# Patient Record
Sex: FEMALE | Race: WHITE | Employment: PART TIME | URBAN - METROPOLITAN AREA
[De-identification: names, ages, dates, MRNs, and addresses within clinical notes are randomized per-mention and may not be internally consistent; named-entity substitution may affect disease eponyms.]

---

## 2017-01-10 ENCOUNTER — HOSPITAL ENCOUNTER (OUTPATIENT)
Dept: MAMMOGRAPHY | Age: 47
Discharge: HOME | End: 2017-01-10
Attending: INTERNAL MEDICINE
Payer: OTHER GOVERNMENT

## 2017-01-10 DIAGNOSIS — Z12.31 VISIT FOR SCREENING MAMMOGRAM: ICD-10-CM

## 2017-01-10 PROCEDURE — 77067 SCR MAMMO BI INCL CAD: CPT

## 2018-01-15 ENCOUNTER — HOSPITAL ENCOUNTER (OUTPATIENT)
Dept: MAMMOGRAPHY | Age: 48
Discharge: HOME | End: 2018-01-15
Attending: INTERNAL MEDICINE
Payer: OTHER GOVERNMENT

## 2018-01-15 DIAGNOSIS — Z12.39 BREAST CANCER SCREENING: ICD-10-CM

## 2018-01-15 PROCEDURE — 77067 SCR MAMMO BI INCL CAD: CPT

## 2018-03-30 ENCOUNTER — OFFICE VISIT (OUTPATIENT)
Dept: ENDOCRINOLOGY | Age: 48
End: 2018-03-30

## 2018-03-30 VITALS
HEART RATE: 92 BPM | SYSTOLIC BLOOD PRESSURE: 126 MMHG | RESPIRATION RATE: 14 BRPM | WEIGHT: 170.4 LBS | BODY MASS INDEX: 31.36 KG/M2 | HEIGHT: 62 IN | DIASTOLIC BLOOD PRESSURE: 78 MMHG

## 2018-03-30 RX ORDER — METFORMIN HYDROCHLORIDE 500 MG/1
1000 TABLET, EXTENDED RELEASE ORAL 2 TIMES DAILY
Qty: 360 TAB | Refills: 3
Start: 2018-03-30 | End: 2018-05-14 | Stop reason: SDUPTHER

## 2018-03-30 RX ORDER — INSULIN PUMP SYRINGE, 3 ML
EACH MISCELLANEOUS
Qty: 1 KIT | Refills: 0 | Status: SHIPPED | OUTPATIENT
Start: 2018-03-30

## 2018-03-30 NOTE — PROGRESS NOTES
Stan Augustine is a 41 y.o. female      Chief Complaint   Patient presents with    New Patient     Pt seen at the request by [facility] to [facility]riel diabetes.  Diabetes     Last A1c 9.5 % [facility] last week. 1. Have you been to the ER, urgent care clinic since your last visit? Hospitalized since your last visit? No    2. Have you seen or consulted any other health care providers outside of the [address] [place] [address] since your last visit? Include any pap smears or colon screening.  No

## 2018-03-30 NOTE — LETTER
March 30, 2018 Re: Cyn Jensen To whom it may concern Ms. Shikha Rutledge was seen in our office today for evaluation and management of type 2 diabetes mellitus. Please excuse her for the time spent in our office this morning. She should be able to return to work at her next scheduled work time. If you have any questions please do not hesitate to contact me at any time Sincerely Thalia Kendall MD

## 2018-03-30 NOTE — PROGRESS NOTES
Presentation:   Rhonda Hampton is a 41 y.o. female with Type 2 diabetes comes for diabetes care and self-management training. Diagnosed with GDM fifteen years ago which did not go away after delivery of second child 2003. Positive family history of diabetes in son. Originally treated with  oral agents: glipizide (generic); now on oral agents (dual therapy): glipizide (generic), metformin (generic). A1c 9.5% (last week). Past Medical History:   Diagnosis Date    Abnormal mammogram     calcifications in scar tissue     AR (allergic rhinitis)     Diabetes ([facility] [address]     TYPE II    Dyslipidemia 4/11/2012    Fibroid 5/2/2014    Gallbladder polyp     Hematuria     reports negative work-up by urology 1997? 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  glipiZIDE (GLUCOTROL) 10 mg tablet TAKE 1 TABLET TWICE A DAY    levothyroxine (SYNTHROID) 25 mcg tablet TAKE 1 TABLET DAILY BEFORE BREAKFAST    lisinopril (PRINIVIL, ZESTRIL) 10 mg tablet TAKE 1 TABLET DAILY    metFORMIN ER (GLUCOPHAGE XR) 750 mg tablet TAKE 3 TABLETS DAILY WITH DINNER    miglitol (GLYSET) 50 mg tablet Take 1 Tab by mouth three (3) times daily (with meals).  magnesium 250 mg tab Take  by mouth.  multivitamin with iron tablet Take 1 Tab by mouth daily.  vitamin c-vitamin e (CRANBERRY CONCENTRATE) cap Take  by mouth.  ibuprofen (MOTRIN) 800 mg tablet Take 1 Tab by mouth every eight (8) hours as needed for Pain.  omega-3 fatty acids-vitamin e (FISH OIL) 1,000 mg cap Take 1 Cap by mouth daily.     GLYSET 25 mg tablet TAKE 1 TABLET THREE TIMES A DAY WITH MEALS    omeprazole (PRILOSEC) 20 mg capsule TAKE 1 CAPSULE DAILY    PREVNAR 13, PF, 0.5 mL syrg injection INJECT ONCE    FLUARIX QUAD 1297-9313, PF, syrg injection INJECT ONCE    pitavastatin (LIVALO) 1 mg tab tablet Take 1 Tab by mouth daily. Take instead of pravastatin due to intolerance.  glucose blood VI test strips (ASCENSIA AUTODISC VI, ONE TOUCH ULTRA TEST VI) strip As directed daily. Precision Extra     No current facility-administered medications for this visit. Medication compliance per rooming staff. Subjective:   Diabetes Self-care Practices  Healthy Eating-Consumes High carbohydrate meals 3 times on most days. Variation in CHO load noted. Twenty-four hour/usual dietary practices include:   (B) Muslim oatmeal with blueberries in a cup with water & coffee   (L) 's salad; cafeteria lunch (pasta with meatballs, broccoli, and mandarin oranges   (Sn) Chips or PB cups   (D) Lots of fast food while mother has been adjusting to nursing home; fish/chicken with starch and vegetables (moreso in summer months)   (BT) Cookies or applesauce or Pepsi  Being Active- Reports no regular exercise; is walking the dog daily   Monitoring-She is not testing blood glucoses  Taking Medications-She is taking prescribed diabetes medications. Objective:   Alert, oriented and in no acute distress     Visit Vitals    /78 (BP 1 Location: Right arm, BP Patient Position: Sitting)    Pulse 92    Resp 14    Ht 5' 2\" (1.575 m)    Wt 170 lb 6.4 oz (77.3 kg)    BMI 31.17 kg/m2        Lab Results   Component Value Date    HBA1C 8.0 (H) 05/23/2016    HBA1C 8.6 (H) 11/25/2015    HBA1C 8.8 (H) 07/10/2015    WAK5OZOS 7.9 (A) 03/01/2016    TJJ1AVDH 8.1 (A) 02/20/2015    DLJ3PTWZ 7.9% 05/02/2014    CHOL 172 08/15/2016    LDLC 74 08/15/2016    GFRAA >60 06/19/2014    GFRNA >60 06/19/2014    MCACR 33.8 (H) 03/01/2016    TSH 2.200 05/23/2016    VITD3 32.0 05/23/2016       Diabetes Self-care Practices  Problemsolving-Participated in constructing diabetes strategy using shared decision making. Healthy Coping-She is not anxious or depressed.  Feels sad.  Reducing Risks- On BP medications and statin therapy. Health Maintenance   Topic Date Due    EYE EXAM RETINAL OR DILATED Q1  2016    HEMOGLOBIN A1C Q6M  2016    MICROALBUMIN Q1  2017    Influenza Age 5 to Adult  2017    FOOT EXAM Q1  08/15/2017    LIPID PANEL Q1  08/15/2017    PAP AKA CERVICAL CYTOLOGY  2019    DTaP/Tdap/Td series (2 - Td) 2025    Pneumococcal 19-64 Medium Risk  Completed      Barriers to diabetes self-care-Include Social: Father  3 years ago. Additionally, as the only child, she has needed to place her mother in demential care recently. There were lots of decisions as well as going through her mother's home etc.    Nursing Diagnosis:   Ineffective Health Management    Nursing Interventions:   Examined usual diabetes self-management practices related to meals, physical activity, BG monitoring and medication dosing. Explored strategies patient is willing to incorporate into their self-care plan. Informed of starchy and non-starchy vegetables that are both negatively and positively affecting blood glucoses. Instructed in Healthy Plate using \"Where do I begin? \" booklet (ADA). Evaluation:   Patient is ready, willing, and agrees to attend to healthy eating principles. Plan:   1. Diabetes medication reconciliation per Verlan Crigler MD  2. Patient will focus on increasing vegetable consumption with smaller servings of starch  3.  RTC one month for serum fructosamine and decision regarding need for insulin therapy    Jose De Paz DNP, RN, ACNS-BC, BC-ADM, CDE  Adult Health Clinical Nurse Specialist-Diabetes & Endocrine  Phone: [phone]  Fax:  [phone]

## 2018-03-30 NOTE — PROGRESS NOTES
Chief Complaint   Patient presents with    New Patient     Pt seen at the request by [facility] to eval diabetes.  Diabetes     Last A1c 9.5 % [facility]. HPI  This is a 68-year-old white female with a history of gestational diabetes 15 years ago referred for evaluation and management of type 2 diabetes mellitus. Her baby weighed 9 lbs. 5 oz. at birth but was actually premature. She was not on any medication during the pregnancy but has had diabetes ever since. She has a son who has type 2 diabetes mellitus and a sister with diabetes. She has been on metformin 1000 mg twice daily and glipizide 10 mg twice daily with intermittently fair blood sugar control. [facility]    Her past medical history is significant for a history of a total abdominal hysterectomy with a fistula involving the bladder resulting in multiple urinary tract infections. She tells us that there is lots of stress in her life related to her mother who is now in a nursing home with Alzheimer's disease. She works at the [employer] as a cook and . She works from 9 until 3. She eats breakfast at home. This is usually prepared oatmeal with a banana and water she drinks a Starbucks coffee which contain sugar. Lunch could be a salad or more often it is of pasta and meatballs and broccoli and oranges. She may have a salad with  so she may have barbecue without the bread. She drinks water for lunch. She snacks on peanut butter cups in the afternoon or occasionally Chipotle chips. Things are very hectic at home and so they may eat out several days a week. If they go out they may have [employer]quel [employer] or while walks up or appropriately cyst salads or pizza. If she has beats that she may have 4-5 slices. She drinks regular Pepsi or water for dinner. Occasionally at home he will have fish with non-starchy vegetables.   They are under the impression that whole grain pasta and whole-grain the lower is better in terms with her blood sugar control. Please also note that her  has type 2 diabetes mellitus and previously was followed by Dr. Rosario Laws here at [facility]. ROS  Denies chest pain, shortness of breath, constipation or diarrhea. She denies neuropathic symptoms. She does complain of urinary tract infections as noted above.   Family History   Problem Relation Age of Onset    Arthritis-osteo Mother     Diabetes Mother     Elevated Lipids Mother     Heart Disease Mother     Hypertension Mother     Depression Mother     Heart Disease Father     Diabetes Father     Heart Disease Maternal Grandmother     Heart Attack Maternal Grandmother     Anesth Problems Neg Hx         Social History     Social History    Marital status:      Spouse name: N/A    Number of children: N/A    Years of education: N/A     Social History Main Topics    Smoking status: Never Smoker    Smokeless tobacco: Never Used    Alcohol use No    Drug use: No    Sexual activity: Not Currently     Partners: Male     Birth control/ protection: Surgical     Other Topics Concern    None     Social History Narrative        Vitals:    03/30/18 1012   BP: 126/78   Pulse: 92   Resp: 14   Weight: 170 lb 6.4 oz (77.3 kg)   Height: 5' 2\" (1.575 m)   PainSc:   0 - No pain   LMP: 06/04/2014        Physical Examination: General appearance - alert, well appearing, and in no distress  Mental status - alert, oriented to person, place, and time  Chest - clear to auscultation, no wheezes, rales or rhonchi, symmetric air entry  Heart - normal rate, regular rhythm, normal S1, S2, no murmurs, rubs, clicks or gallops  Abdomen - soft, nontender, nondistended, no masses or organomegaly  Extremities - peripheral pulses normal, no pedal edema, no clubbing or cyanosis    Diabetic foot exam:     Left: Reflexes 4+     Vibratory sensation normal    Filament test normal sensation with micro filament   Pulse DP: 2+ (normal)   Pulse PT: 2+ (normal)   Deformities: None  Right: Reflexes 4+   Vibratory sensation normal   Filament test normal sensation with micro filament   Pulse DP: 2+ (normal)   Pulse PT: 2+ (normal)   Deformities: None      ASSESSMENT & PLAN  This woman has type 2 diabetes mellitus currently on full dose metformin and full dose glipizide. She had been on Januvia 25 mg but but heard about the possibility of pancreatic cancer and so she stopped it on her own. It is important to note that even on metformin plus glipizide plus low-dose Januvia, her blood sugars were not well controlled. So the recent A1c of 9.5% reflects that medication as well. Our plan is as follows:  1. We would like to emphasize diet as a first step in her blood sugar control. Vandana White spent considerable time today going over her diet and emphasizing non-starchy vegetables and a reduction in her carbohydrate intake. She admits that when they go out to eat she knows which foods she should eat but she then makes the wrong choice most of the time. So we will try 1 month of her emphasizing her carbohydrate intake and focusing on non-starchy vegetables. 2.  We encouraged her to start checking blood sugar in the morning so we have a sense for where we are going. 3.  The addition of a GLP-1 is certainly a possibility. Given her history of urinary tract infections related to her bladder fistula, the use of a SGLT2 inhibitor seems unwise. 4.  We will see her back in 1 month.

## 2018-05-14 ENCOUNTER — OFFICE VISIT (OUTPATIENT)
Dept: ENDOCRINOLOGY | Age: 48
End: 2018-05-14

## 2018-05-14 VITALS
BODY MASS INDEX: 31.17 KG/M2 | HEART RATE: 92 BPM | WEIGHT: 169.4 LBS | HEIGHT: 62 IN | SYSTOLIC BLOOD PRESSURE: 107 MMHG | DIASTOLIC BLOOD PRESSURE: 78 MMHG

## 2018-05-14 DIAGNOSIS — E11.9 TYPE 2 DIABETES MELLITUS WITHOUT COMPLICATION, WITHOUT LONG-TERM CURRENT USE OF INSULIN (HCC): Primary | ICD-10-CM

## 2018-05-14 LAB — HBA1C MFR BLD HPLC: 8.5 %

## 2018-05-14 RX ORDER — GLIPIZIDE 10 MG/1
TABLET ORAL
Qty: 180 TAB | Refills: 3 | Status: SHIPPED | OUTPATIENT
Start: 2018-05-14 | End: 2018-11-09 | Stop reason: ALTCHOICE

## 2018-05-14 RX ORDER — METFORMIN HYDROCHLORIDE 500 MG/1
1000 TABLET, EXTENDED RELEASE ORAL 2 TIMES DAILY
Qty: 360 TAB | Refills: 3 | Status: SHIPPED | OUTPATIENT
Start: 2018-05-14 | End: 2019-03-22 | Stop reason: SDUPTHER

## 2018-05-14 NOTE — PROGRESS NOTES
Barbara Roach is a 41 y.o. female    Chief Complaint   Patient presents with    Follow-up    Diabetes    Other     Eye exam is on June 5th, 2018       1. Have you been to the ER, urgent care clinic since your last visit? Hospitalized since your last visit? No    2. Have you seen or consulted any other health care providers outside of the [address] [place] [address] since your last visit? Include any pap smears or colon screening.  No

## 2018-05-14 NOTE — PROGRESS NOTES
The patient was seen by myself and Emilia Busby DNP, CNS    Ms. James Hickey returns for follow-up of her diabetes mellitus currently on full dose metformin and full dose glipizide. Although it is only been about 6 weeks since her last A1c, we repeated it today and it is down to 8.5 from a high of 9.5%. She checks her blood sugars in the morning and in the evening. Morning blood sugars are around 180-200. The afternoon blood sugars range from 100-125. She has not had any hypoglycemia. She is eating very low-carb at this point. Her cough he now has Splenda instead of regular sugar. She is eliminated fast foods. Breakfast is usually oatmeal with a banana or eggs and toast.  Lunch is a salad with protein. Nat Cruel is a fish or chicken with non-starchy vegetables and if she has a potato is a very small portion. She occasionally has fruit or peanut butter toast at bedtime. Examination  Blood pressure 107/78  Pulse 92  Weight 169    Impression type 2 diabetes mellitus with improving glucose control although certainly not at goal.  Plan: We have elected to continue the oral therapy that she is on for now. She and her son are going to work on weight loss together. Her son is 13years old and weighs 280 pounds and has been told by the  that he needs to lose weight. The patient is hopeful that if the 2 of them do this together, she will lose weight and her blood sugars were get much better. So we will see her back in 2 months. We will probably do a serum fructosamine at that time to assess the efficacy of the therapy. We spent more than 25 mintutes face to face, more than 50% was in counseling regarding diet, exercise and carbohydrate intake.

## 2018-08-13 ENCOUNTER — OFFICE VISIT (OUTPATIENT)
Dept: ENDOCRINOLOGY | Age: 48
End: 2018-08-13

## 2018-08-13 VITALS
BODY MASS INDEX: 30.95 KG/M2 | HEIGHT: 62 IN | HEART RATE: 94 BPM | WEIGHT: 168.2 LBS | SYSTOLIC BLOOD PRESSURE: 123 MMHG | DIASTOLIC BLOOD PRESSURE: 80 MMHG

## 2018-08-13 DIAGNOSIS — E11.9 TYPE 2 DIABETES MELLITUS WITHOUT COMPLICATION, WITHOUT LONG-TERM CURRENT USE OF INSULIN (HCC): Primary | ICD-10-CM

## 2018-08-13 LAB — HBA1C MFR BLD HPLC: 8.3 %

## 2018-08-13 RX ORDER — FLUCONAZOLE 150 MG/1
150 TABLET ORAL DAILY
Qty: 1 TAB | Refills: 0 | Status: SHIPPED | OUTPATIENT
Start: 2018-08-13 | End: 2018-08-14

## 2018-08-13 RX ORDER — NAPROXEN SODIUM 220 MG
1 TABLET ORAL DAILY
Qty: 100 SYRINGE | Refills: 3 | Status: SHIPPED | OUTPATIENT
Start: 2018-08-13 | End: 2018-08-14 | Stop reason: SDUPTHER

## 2018-08-13 RX ORDER — GLIPIZIDE 10 MG/1
10 TABLET, FILM COATED, EXTENDED RELEASE ORAL DAILY
COMMUNITY
Start: 2018-08-03 | End: 2018-11-09 | Stop reason: ALTCHOICE

## 2018-08-13 NOTE — PROGRESS NOTES
The patient was seen by myself and Martín Voss DNP, CNS    Ms. Sheridan Steiner returns for follow-up of her type 2 diabetes mellitus with poor blood sugar control on metformin plus glipizide. When we saw her last her A1c was 8.5%. Her A1c today is 8.3%. She has been trying very hard and she is pleased that her A1c has fallen at all. However she admits that her blood sugars in the morning are consistently in the 200-230 range. She occasionally checks before dinner and they can be in the  range. She has been exercising with her son at [employer]. She is also been watching what she eats. Despite that she sees a fasting blood sugars that are consistently elevated and she is very frustrated. Examination  Blood pressure 123/80  Pulse 94  Weight 168    Impression type 2 diabetes mellitus with minimal change in A1c. Plan: We elected to begin NPH insulin 20 units at bedtime. She was given a sample of NPH and a prescription was written. We will titrate the dose up or down to achieve a fasting blood sugar in the  range. Both her  and her son take insulin and so she will have no difficulty administering the insulin. Overall I think she is pleased that we are going to do something to get her blood sugar down. We will see her back at the same time that we visit her son who will be a new patient to us. We spent more than 25 mintutes face to face, more than 50% was in counseling regarding diet, exercise and carbohydrate intake.

## 2018-08-14 DIAGNOSIS — E11.9 TYPE 2 DIABETES MELLITUS WITHOUT COMPLICATION, WITHOUT LONG-TERM CURRENT USE OF INSULIN (HCC): ICD-10-CM

## 2018-08-14 RX ORDER — NAPROXEN SODIUM 220 MG
1 TABLET ORAL DAILY
Qty: 100 SYRINGE | Refills: 3 | Status: SHIPPED | OUTPATIENT
Start: 2018-08-14 | End: 2019-11-08 | Stop reason: SDUPTHER

## 2018-08-14 NOTE — TELEPHONE ENCOUNTER
Patient is requesting a new prescription to be sent to [facility] for her syringes. She stated that originally it was sent to [facility] but they informed her that she can get it from her local pharmacy. Patient can be reached at [phone] and the pharmacy is [phone].
Requested Prescriptions     Pending Prescriptions Disp Refills    Insulin Syringe-Needle U-100 (ADVOCATE SYRINGES) 0.5 mL 31 gauge x 5/16 syrg 100 Syringe 3     Si Each by Does Not Apply route daily.
DISPLAY PLAN FREE TEXT

## 2018-08-20 ENCOUNTER — TELEPHONE (OUTPATIENT)
Dept: ENDOCRINOLOGY | Age: 48
End: 2018-08-20

## 2018-08-20 NOTE — TELEPHONE ENCOUNTER
Ms. Adrián Enriquez evening blood sugar readings for the week of August 14th.    8/14: 161  8/15: 165  8/16: 164  8/17: 198  8/18: 169  8/19: 177    Ms. Valerie Graff would like a call back instead of a [employer] message to discuss if her insulin needs to be adjusted. She stated that she is currently locked out of her [employer] account.

## 2018-09-28 ENCOUNTER — OFFICE VISIT (OUTPATIENT)
Dept: ENDOCRINOLOGY | Age: 48
End: 2018-09-28

## 2018-09-28 VITALS
HEART RATE: 88 BPM | WEIGHT: 172.2 LBS | HEIGHT: 62 IN | BODY MASS INDEX: 31.69 KG/M2 | SYSTOLIC BLOOD PRESSURE: 134 MMHG | DIASTOLIC BLOOD PRESSURE: 79 MMHG

## 2018-09-28 NOTE — PROGRESS NOTES
The patient was seen by myself and Leia José DNP, CNS    Ms. Sb Smiley returns for follow-up of her type 2 diabetes mellitus. When we saw her a month ago, we added 20 units of NPH insulin at bedtime. She is been doing this for the last month and her fasting blood sugars have improved nicely. Most of the fasting blood sugars range between 101 140. She rarely checks any other time of the day. She does however have a few low blood sugars in the afternoon which she attributes to lower meals in the morning and/or missing lunch. She is back to work as a teacher. Breakfast is toast with peanut butter and fruit. Lunch is a salad with tuna or chicken and fruit. Ruthe Henry is usually fish with vegetables and a small amount a starch or occasionally positive. She is walking for 30 minutes after school. Examination  Blood pressure 134/79  Pulse 88  Weight 172    Impression type 2 diabetes mellitus with improving control since we have added NPH insulin 28 units at bedtime. She continues the glipizide and metformin. Plan: Given the fact that she is having some symptomatic hypoglycemia in the afternoon, we have recommended that she decrease the insulin to 24 units and continue the glipizide and metformin. We will see her back in 1 month and repeat her A1c. Please note she is very pleased with the improvement in her blood sugars. We spent more than 25 mintutes face to face, more than 50% was in counseling regarding diet, exercise and carbohydrate intake.

## 2018-10-03 RX ORDER — LISINOPRIL 10 MG/1
10 TABLET ORAL DAILY
Qty: 90 TAB | Refills: 3 | Status: SHIPPED | OUTPATIENT
Start: 2018-10-03 | End: 2019-03-22 | Stop reason: SDUPTHER

## 2018-10-03 NOTE — TELEPHONE ENCOUNTER
Mrs. Caitie Ho called and stated that Dr. Davina Monzon was suppose to send in a prescription for her lisinopril last Friday.

## 2018-11-09 ENCOUNTER — OFFICE VISIT (OUTPATIENT)
Dept: ENDOCRINOLOGY | Age: 48
End: 2018-11-09

## 2018-11-09 VITALS
HEIGHT: 62 IN | WEIGHT: 174 LBS | SYSTOLIC BLOOD PRESSURE: 129 MMHG | HEART RATE: 78 BPM | BODY MASS INDEX: 32.02 KG/M2 | DIASTOLIC BLOOD PRESSURE: 71 MMHG

## 2018-11-09 DIAGNOSIS — E11.65 UNCONTROLLED TYPE 2 DIABETES MELLITUS WITH HYPERGLYCEMIA (HCC): Primary | ICD-10-CM

## 2018-11-09 RX ORDER — GLIPIZIDE 5 MG/1
5 TABLET ORAL 2 TIMES DAILY
Qty: 180 TAB | Refills: 3 | Status: SHIPPED | OUTPATIENT
Start: 2018-11-09 | End: 2018-12-21 | Stop reason: ALTCHOICE

## 2018-11-09 RX ORDER — ROSUVASTATIN CALCIUM 5 MG/1
5 TABLET, COATED ORAL
Qty: 90 TAB | Refills: 3 | Status: SHIPPED | OUTPATIENT
Start: 2018-11-09 | End: 2018-11-09 | Stop reason: SDUPTHER

## 2018-11-09 RX ORDER — ROSUVASTATIN CALCIUM 5 MG/1
5 TABLET, COATED ORAL
Qty: 90 TAB | Refills: 3 | Status: SHIPPED | OUTPATIENT
Start: 2018-11-09 | End: 2019-03-22 | Stop reason: SDUPTHER

## 2018-11-09 RX ORDER — GLIPIZIDE 10 MG/1
10 TABLET, FILM COATED, EXTENDED RELEASE ORAL 2 TIMES DAILY
Qty: 180 TAB | Refills: 3 | Status: CANCELLED | OUTPATIENT
Start: 2018-11-09

## 2018-11-09 NOTE — PROGRESS NOTES
Ms. Bucky Burrell returns for follow-up of her type 2 diabetes mellitus currently on 24 units of NPH at night and metformin thousand milligrams twice daily and glipizide 10 mg twice daily. The addition of insulin has had a dramatic effect in her blood sugars. She was originally on 28 units of NPH insulin at night and her blood sugars in the morning were in the 120-140 range. She was concerned about low blood sugars so I decreased the insulin to 24 units. Unfortunately her fasting blood sugars are now in the 140-160 range. Importantly, however she is now having low blood sugars in the evening. She frequently sees blood sugars in the 40-70 range. Day before yesterday she had a morning blood sugar of 181 but her blood sugar at 4 PM of 73. Breakfast is 2 pieces of toast with peanut butter. Lunch is a sandwich. Last night for dinner she had someone times and vegetables and woke up this morning with a blood sugar of 160. She is walking every day and is feeling better about her diabetes. Examination  Blood pressure 129/71  Pulse 78  Weight 174    Impression type 2 diabetes mellitus with a modest improvement in glucose but with enhanced glipizide effect. Plan: I have decreased the glipizide to 5 mg twice daily but increase the NPH back to 28 units. We will see her back in 1 month and repeat her A1c at that time. We spent more than 25 mintutes face to face, more than 50% was in counseling regarding diet, exercise and carbohydrate intake.

## 2018-11-09 NOTE — LETTER
11/13/2018 11:05 AM 
 
Ms. Lit Porras [address] [facility] [id number] Dear Lit Porras: 
 
Please find your most recent results below. Resulted Orders METABOLIC PANEL, COMPREHENSIVE Result Value Ref Range Glucose 218 (H) 65 - 99 mg/dL BUN 10 6 - 24 mg/dL Creatinine 0.58 0.57 - 1.00 mg/dL GFR est non- >59 mL/min/1.73 GFR est  >59 mL/min/1.73  
 BUN/Creatinine ratio 17 9 - 23 Sodium 138 134 - 144 mmol/L Potassium 4.2 3.5 - 5.2 mmol/L Chloride 99 96 - 106 mmol/L  
 CO2 28 20 - 29 mmol/L Calcium 9.2 8.7 - 10.2 mg/dL Protein, total 6.7 6.0 - 8.5 g/dL Albumin 4.4 3.5 - 5.5 g/dL GLOBULIN, TOTAL 2.3 1.5 - 4.5 g/dL A-G Ratio 1.9 1.2 - 2.2 Bilirubin, total <0.2 0.0 - 1.2 mg/dL Alk. phosphatase 79 39 - 117 IU/L  
 AST (SGOT) 21 0 - 40 IU/L  
 ALT (SGPT) 18 0 - 32 IU/L Narrative Performed at:  [address]  [id number] : Murray Gutierrez MD, Phone:  [phone] LIPID PANEL Result Value Ref Range Cholesterol, total 127 100 - 199 mg/dL Triglyceride 212 (H) 0 - 149 mg/dL HDL Cholesterol 54 >39 mg/dL VLDL, calculated 42 (H) 5 - 40 mg/dL LDL, calculated 31 0 - 99 mg/dL Narrative Performed at:  [address]  [id number] : Murray Gutierrez MD, Phone:  [phone] MICROALBUMIN, UR, RAND W/ MICROALB/CREAT RATIO Result Value Ref Range Creatinine, urine 79.6 Not Estab. mg/dL Microalbumin, urine 6.8 Not Estab. ug/mL Microalb/Creat ratio (ug/mg creat.) 8.5 0.0 - 30.0 mg/g creat Comment:  
                        Normal:                0.0 -  30.0 Albuminuria:          31.0 - 300.0 Clinical albuminuria:       >300.0 Narrative Performed at:  [address]  [id number] : Ezequiel Parker MD, Phone:  [phone] CVD REPORT Result Value Ref Range INTERPRETATION Note Comment:  
   Supplemental report is available. Narrative Performed at:  [address] [place] [address]  [id number] : Doretha Hill MD, Phone:  [phone] DIABETES PATIENT EDUCATION Result Value Ref Range PDF Image Not applicable Narrative Performed at:  [address] [place] [address]  [id number] : Doretha Hill MD, Phone:  [phone] RECOMMENDATIONS: 
Looking much better. Keep it up. [facility] Please call me if you have any questions: [phone] Sincerely, 
 
 
Divina Cain MD

## 2018-11-10 LAB
ALBUMIN SERPL-MCNC: 4.4 G/DL (ref 3.5–5.5)
ALBUMIN/CREAT UR: 8.5 MG/G CREAT (ref 0–30)
ALBUMIN/GLOB SERPL: 1.9 {RATIO} (ref 1.2–2.2)
ALP SERPL-CCNC: 79 IU/L (ref 39–117)
ALT SERPL-CCNC: 18 IU/L (ref 0–32)
AST SERPL-CCNC: 21 IU/L (ref 0–40)
BILIRUB SERPL-MCNC: <0.2 MG/DL (ref 0–1.2)
BUN SERPL-MCNC: 10 MG/DL (ref 6–24)
BUN/CREAT SERPL: 17 (ref 9–23)
CALCIUM SERPL-MCNC: 9.2 MG/DL (ref 8.7–10.2)
CHLORIDE SERPL-SCNC: 99 MMOL/L (ref 96–106)
CHOLEST SERPL-MCNC: 127 MG/DL (ref 100–199)
CO2 SERPL-SCNC: 28 MMOL/L (ref 20–29)
CREAT SERPL-MCNC: 0.58 MG/DL (ref 0.57–1)
CREAT UR-MCNC: 79.6 MG/DL
GLOBULIN SER CALC-MCNC: 2.3 G/DL (ref 1.5–4.5)
GLUCOSE SERPL-MCNC: 218 MG/DL (ref 65–99)
HDLC SERPL-MCNC: 54 MG/DL
INTERPRETATION, 910389: NORMAL
LDLC SERPL CALC-MCNC: 31 MG/DL (ref 0–99)
Lab: NORMAL
MICROALBUMIN UR-MCNC: 6.8 UG/ML
POTASSIUM SERPL-SCNC: 4.2 MMOL/L (ref 3.5–5.2)
PROT SERPL-MCNC: 6.7 G/DL (ref 6–8.5)
SODIUM SERPL-SCNC: 138 MMOL/L (ref 134–144)
TRIGL SERPL-MCNC: 212 MG/DL (ref 0–149)
VLDLC SERPL CALC-MCNC: 42 MG/DL (ref 5–40)

## 2018-12-21 ENCOUNTER — OFFICE VISIT (OUTPATIENT)
Dept: ENDOCRINOLOGY | Age: 48
End: 2018-12-21

## 2018-12-21 VITALS
SYSTOLIC BLOOD PRESSURE: 130 MMHG | BODY MASS INDEX: 31.73 KG/M2 | WEIGHT: 172.4 LBS | HEART RATE: 100 BPM | HEIGHT: 62 IN | DIASTOLIC BLOOD PRESSURE: 79 MMHG

## 2018-12-21 DIAGNOSIS — E03.4 HYPOTHYROIDISM DUE TO ACQUIRED ATROPHY OF THYROID: ICD-10-CM

## 2018-12-21 LAB — HBA1C MFR BLD HPLC: 7.7 %

## 2018-12-21 RX ORDER — LEVOTHYROXINE SODIUM 25 UG/1
25 TABLET ORAL
Qty: 90 TAB | Refills: 3 | Status: CANCELLED | OUTPATIENT
Start: 2018-12-21

## 2018-12-21 NOTE — PROGRESS NOTES
Ms. David Berry returns for follow-up of her type 2 diabetes mellitus. We added NPH insulin to her metformin and glipizide and her blood sugars have improved. They were actually much better in November after adding the insulin and we actually decrease the dose of NPH. However we also discontinued 1 tablet of glipizide. Currently she is on 28 units of NPH at bedtime, metformin thousand milligrams twice daily and glipizide 5 mg once daily. Her fasting blood sugars are generally running between 140 and 160. She checks a few blood sugar blood sugars later in the day and they are in the 120-130 range. She did have one blood sugar of 50 6 in the afternoon. Even if she does not check, she feels like her blood sugar gets low in the afternoon since then she eats something before dinner. She will then have the dinner meal.    Breakfast is 2 pieces of toast with peanut butter and jelly lunch is usually a salad with fruit dinner last night was pasta and salad. Her blood sugar this morning was 164. Examination  Blood pressure 130/79  Pulse 100  Weight 172    Impression type 2 diabetes mellitus with improving glucose control. Her A1c has decreased from 8.3% to 7.7% today. Plan: We continue the insulin as noted above. We also continue the Metformin. However we discontinue the Glucotrol altogether. We will see her back in 3 months and repeat her A1c at that time. Regarding her history of hypothyroidism, she has not had a TSH in quite some time so I repeated her TSH and free T4 today. She is due for a refill of her 25 mcg of levothyroxine daily. Based on the results of the test we will determine a dose for her. Please note that she is taking the levothyroxine with all of the rest of her medications so an elevated TSH may be related to poor absorption then an inadequate dose. We spent more than 25 mintutes face to face, more than 50% was in counseling regarding diet, exercise and carbohydrate intake.

## 2018-12-22 DIAGNOSIS — E03.4 HYPOTHYROIDISM DUE TO ACQUIRED ATROPHY OF THYROID: Primary | ICD-10-CM

## 2018-12-22 LAB
T4 FREE SERPL-MCNC: 1.06 NG/DL (ref 0.82–1.77)
TSH SERPL DL<=0.005 MIU/L-ACNC: 2.27 UIU/ML (ref 0.45–4.5)

## 2018-12-22 RX ORDER — LEVOTHYROXINE SODIUM 25 UG/1
TABLET ORAL
Qty: 90 TAB | Refills: 3 | Status: SHIPPED | OUTPATIENT
Start: 2018-12-22 | End: 2020-02-20 | Stop reason: SDUPTHER

## 2019-01-16 ENCOUNTER — HOSPITAL ENCOUNTER (OUTPATIENT)
Dept: MAMMOGRAPHY | Age: 49
Discharge: HOME | End: 2019-01-16
Attending: PHYSICIAN ASSISTANT
Payer: OTHER GOVERNMENT

## 2019-01-16 DIAGNOSIS — Z12.39 SCREENING BREAST EXAMINATION: ICD-10-CM

## 2019-01-16 PROCEDURE — 77067 SCR MAMMO BI INCL CAD: CPT

## 2019-03-22 ENCOUNTER — OFFICE VISIT (OUTPATIENT)
Dept: ENDOCRINOLOGY | Age: 49
End: 2019-03-22

## 2019-03-22 VITALS
HEIGHT: 62 IN | HEART RATE: 84 BPM | SYSTOLIC BLOOD PRESSURE: 118 MMHG | BODY MASS INDEX: 32.24 KG/M2 | WEIGHT: 175.2 LBS | DIASTOLIC BLOOD PRESSURE: 62 MMHG

## 2019-03-22 DIAGNOSIS — E11.65 UNCONTROLLED TYPE 2 DIABETES MELLITUS WITH HYPERGLYCEMIA (HCC): ICD-10-CM

## 2019-03-22 DIAGNOSIS — E11.42 TYPE 2 DIABETES MELLITUS WITH DIABETIC POLYNEUROPATHY, WITHOUT LONG-TERM CURRENT USE OF INSULIN (HCC): Primary | ICD-10-CM

## 2019-03-22 DIAGNOSIS — E11.9 TYPE 2 DIABETES MELLITUS WITHOUT COMPLICATION, WITHOUT LONG-TERM CURRENT USE OF INSULIN (HCC): ICD-10-CM

## 2019-03-22 RX ORDER — METFORMIN HYDROCHLORIDE 500 MG/1
1000 TABLET, EXTENDED RELEASE ORAL 2 TIMES DAILY
Qty: 360 TAB | Refills: 3 | Status: SHIPPED | OUTPATIENT
Start: 2019-03-22 | End: 2020-02-20 | Stop reason: SDUPTHER

## 2019-03-22 RX ORDER — GLIPIZIDE 10 MG/1
10 TABLET ORAL 2 TIMES DAILY
Qty: 180 TAB | Refills: 3 | Status: SHIPPED | OUTPATIENT
Start: 2019-03-22 | End: 2019-07-11 | Stop reason: ALTCHOICE

## 2019-03-22 RX ORDER — LISINOPRIL 10 MG/1
10 TABLET ORAL DAILY
Qty: 90 TAB | Refills: 3 | Status: SHIPPED | OUTPATIENT
Start: 2019-03-22 | End: 2020-02-20 | Stop reason: SDUPTHER

## 2019-03-22 RX ORDER — ROSUVASTATIN CALCIUM 5 MG/1
5 TABLET, COATED ORAL
Qty: 90 TAB | Refills: 3 | Status: SHIPPED | OUTPATIENT
Start: 2019-03-22 | End: 2020-02-20 | Stop reason: SDUPTHER

## 2019-03-22 NOTE — PROGRESS NOTES
Ms. Juan F Mccloud returns for follow-up of her type 2 diabetes mellitus. When we saw her last her A1c was 7.8%. Her A1c today is 7.9%. She was actually doing better on her regimen of metformin 1000 mg twice daily and NPH insulin 28 units at bedtime. But she got the flu and her blood sugars were increased for a week or so before she was diagnosed with the flu and for several days after the flu. She is finally getting back to baseline. Her blood sugars in the morning were in the 150-180 range and occasionally into the 200s. They are now beginning to come back down. She actually added glipizide 10 mg twice daily back to her regimen because she was seeing higher numbers. Her diet is really unchanged and she is walking the dog regularly. Examination  Blood pressure 118/62  Pulse 84  Weight 175    Impression type 2 diabetes mellitus with some persistent elevations in blood sugar  Plan: I advised her to start increasing the bedtime NPH by 2 units every 4-5 days until her fasting blood sugars consistently 120. We also encouraged increased physical activity and dietary discretion as much as possible. We will see her back in 3 months. We spent more than 25 mintutes face to face, more than 50% was in counseling regarding diet, exercise and carbohydrate intake.

## 2019-03-25 ENCOUNTER — TELEPHONE (OUTPATIENT)
Dept: ENDOCRINOLOGY | Age: 49
End: 2019-03-25

## 2019-03-25 NOTE — TELEPHONE ENCOUNTER
----- Message from Lynn Smyth sent at 3/25/2019  1:47 PM EDT -----  Regarding: /Telephone  Pt called requesting a call back in regard to following up if [facility] sent over a new updated referral . Pt best contact number is [phone] .

## 2019-03-28 NOTE — TELEPHONE ENCOUNTER
Jennifer Driscoll,     I spoke with Mrs. Brittney Smith and she wanted to know if you received her updated referral from her insurance company?

## 2019-03-28 NOTE — TELEPHONE ENCOUNTER
Called patient back to let her know that we still haven't received the insurance referral.    She is on Spring Break next week and will bring the copy by here to scan to her chart.

## 2019-06-28 ENCOUNTER — OFFICE VISIT (OUTPATIENT)
Dept: ENDOCRINOLOGY | Age: 49
End: 2019-06-28

## 2019-06-28 VITALS
BODY MASS INDEX: 32.39 KG/M2 | SYSTOLIC BLOOD PRESSURE: 130 MMHG | DIASTOLIC BLOOD PRESSURE: 76 MMHG | HEART RATE: 93 BPM | HEIGHT: 62 IN | WEIGHT: 176 LBS

## 2019-06-28 DIAGNOSIS — E11.42 TYPE 2 DIABETES MELLITUS WITH DIABETIC POLYNEUROPATHY, WITHOUT LONG-TERM CURRENT USE OF INSULIN (HCC): Primary | ICD-10-CM

## 2019-06-28 LAB — HBA1C MFR BLD HPLC: 7.8 %

## 2019-06-28 NOTE — PROGRESS NOTES
Ms. Mi Akhtar returns for follow-up of her type 2 diabetes mellitus on combination therapy with A1c's of 7.8%. We saw her last time her A1c was 7.8%. Her A1c today is 7.8%. She is taking 35 units of NPH at night along with full dose metformin and glipizide. She checks her blood sugars in the morning and they are in the 1 30-1 60 range blood sugars to the rest of the day are much better. Her blood sugars are always higher in the morning. She is been trying to watch what she eats during the day but the problem is she has a reasonable dinner meal but then she snacks from dinnertime until bedtime and then blood sugar the next morning is elevated. At least that is what she thinks is happening. We do not know for sure. Examination  Blood pressure 130/76  Pulse 93  Weight 176    Impression type 2 diabetes mellitus with inadequate glucose control. Plan: We have asked her for 1 month to not have snacks after dinner at night and to check her blood sugar in the morning and at bedtime. She will come back in 1 month and we will review the blood sugars. If what she is doing now is not working and if her blood sugars are persistently elevated despite the absence of snacking at bedtime, and we need to come up with a different strategy. A GLP-1 may be an option. We spent more than 25 mintutes face to face, more than 50% was in counseling regarding diet, exercise and carbohydrate intake.

## 2019-07-11 RX ORDER — GLIPIZIDE 10 MG/1
10 TABLET, FILM COATED, EXTENDED RELEASE ORAL 2 TIMES DAILY
Qty: 180 TAB | Refills: 3 | Status: SHIPPED | OUTPATIENT
Start: 2019-07-11 | End: 2020-02-20 | Stop reason: SDUPTHER

## 2019-08-02 ENCOUNTER — OFFICE VISIT (OUTPATIENT)
Dept: ENDOCRINOLOGY | Age: 49
End: 2019-08-02

## 2019-08-02 VITALS
WEIGHT: 179 LBS | SYSTOLIC BLOOD PRESSURE: 124 MMHG | RESPIRATION RATE: 15 BRPM | BODY MASS INDEX: 32.94 KG/M2 | HEART RATE: 96 BPM | DIASTOLIC BLOOD PRESSURE: 76 MMHG | HEIGHT: 62 IN

## 2019-08-02 DIAGNOSIS — E11.9 TYPE 2 DIABETES MELLITUS WITHOUT COMPLICATION, WITHOUT LONG-TERM CURRENT USE OF INSULIN (HCC): ICD-10-CM

## 2019-08-02 RX ORDER — INSULIN LISPRO 100 [IU]/ML
40 INJECTION, SUSPENSION SUBCUTANEOUS DAILY
Qty: 8 ADJUSTABLE DOSE PRE-FILLED PEN SYRINGE | Refills: 3 | Status: SHIPPED | OUTPATIENT
Start: 2019-08-02 | End: 2019-11-08 | Stop reason: ALTCHOICE

## 2019-08-02 RX ORDER — NAPROXEN SODIUM 220 MG
1 TABLET ORAL DAILY
Qty: 100 SYRINGE | Refills: 3 | Status: CANCELLED | OUTPATIENT
Start: 2019-08-02

## 2019-08-02 NOTE — PROGRESS NOTES
Erika Burton is a 57 y.o. female   Chief Complaint   Patient presents with    Diabetes     1 month f/u     Visit Vitals  /76 (BP 1 Location: Left arm, BP Patient Position: Sitting)   Pulse 96   Resp 15   Ht 5' 2\" (1.575 m)   Wt 179 lb (81.2 kg)   LMP 06/04/2014   BMI 32.74 kg/m²     1. Have you been to the ER, urgent care clinic since your last visit? Hospitalized since your last visit? No     2. Have you seen or consulted any other health care providers outside of the [address] [place] [address] since your last visit? Include any pap smears or colon screening.  No

## 2019-08-02 NOTE — PROGRESS NOTES
Ms. Cristopher Camargo returns for follow-up of her type 2 diabetes mellitus with poor blood sugar control. She has been taking metformin 1000 mg twice daily, glipizide 10 mg, and NPH insulin 40 units at bedtime. Her fasting blood sugars are actually very good and they have been for very long time. However she has not been clear about what her blood sugar does between suppertime and bedtime. So we asked her to record her diet and her blood sugars before and after dinner. She did so and it was very illuminating for her. In general even if she goes into dinner with a blood sugar of 100 by bedtime her blood sugars in the 1  range. She has been writing down what she ate and her blood sugar before after and then the next morning. The meals can be spaghetti and a role or barbecue chicken and string beans or baked fish corn squash and zucchini or hamburger macaroni salad and squash. the dose of 40 units of NPH which she takes at night brings her blood sugar down very nicely but again the post dinner blood sugar is consistently elevated. She is not having any hypoglycemia. Examination  Blood pressure 124/76  Pulse 96  Weight 179    Impression type 2 diabetes mellitus with good fasting blood sugars but elevated postprandial blood sugars at dinnertime. Plan: I have switched her from NPH to Humalog 75/25 insulin. She will continue to take 40 units but pre-dinner. This will essentially give her 28 units of long-acting insulin and 10 units of short acting insulin to cover dinner. She will continue to monitor her blood sugar and return in 3 months. At that time we will get all screening laboratory tests and repeat her A1c. We spent more than 25 mintutes face to face, more than 50% was in counseling regarding diet, exercise and carbohydrate intake.

## 2019-11-08 ENCOUNTER — OFFICE VISIT (OUTPATIENT)
Dept: ENDOCRINOLOGY | Age: 49
End: 2019-11-08

## 2019-11-08 VITALS
BODY MASS INDEX: 32.9 KG/M2 | HEIGHT: 62 IN | WEIGHT: 178.8 LBS | DIASTOLIC BLOOD PRESSURE: 73 MMHG | SYSTOLIC BLOOD PRESSURE: 130 MMHG | HEART RATE: 87 BPM

## 2019-11-08 DIAGNOSIS — E11.9 TYPE 2 DIABETES MELLITUS WITHOUT COMPLICATION, WITHOUT LONG-TERM CURRENT USE OF INSULIN (HCC): Primary | ICD-10-CM

## 2019-11-08 LAB — HBA1C MFR BLD HPLC: 7.5 %

## 2019-11-08 RX ORDER — NAPROXEN SODIUM 220 MG
1 TABLET ORAL DAILY
Qty: 100 SYRINGE | Refills: 3 | Status: SHIPPED | OUTPATIENT
Start: 2019-11-08 | End: 2020-09-14 | Stop reason: SDUPTHER

## 2019-11-08 NOTE — PROGRESS NOTES
Ms. Denzel Dennis returns for follow-up of her type 2 diabetes mellitus with poor blood sugar control. She has been taking metformin 1000 mg twice daily, glipizide 10 mg, and NPH insulin 40 units at bedtime. Her fasting blood sugars are actually very good and they have been for very long time. However she has not been clear about what her blood sugar does between suppertime and bedtime. So we asked her to record her diet and her blood sugars before and after dinner. She did so and it was very illuminating for her. In general even if she goes into dinner with a blood sugar of 100 by bedtime her blood sugars in the 180-330 range. At our last visit, we switched her from NPH at bedtime to 75/2540 units before supper. Her A1c now has dropped from 7.8% to 7.5% on this regimen. Her blood sugars in the morning now range between 160 and 170 and her predinner blood sugars range between 60 and 90. She feels better and she is very pleased with her blood sugar improvements. Examination  Blood pressure 130/73  Pulse 87  Weight 178    Diabetic foot exam:     Left Foot:   Visual Exam: normal    Pulse DP: 2+ (normal)   Filament test: normal sensation    Vibratory sensation: normal      Right Foot:   Visual Exam: normal    Pulse DP: 2+ (normal)   Filament test: normal sensation    Vibratory sensation: normal    Impression type 2 diabetes mellitus with significantly improved glucose control on 75/25 insulin before dinner. Plan: At her request, we have switched her to Novolin 70/30 because of cost.  So she will be taking 40 to 45 units before dinner only. We anticipate her A1c continuing in a good range and perhaps improving a bit more. We will see her back in 3 to 4 months or sooner as needed. We did obtain all screening laboratory tests today and I will call her with the results. We spent more than 25 mintutes face to face, more than 50% was in counseling regarding diet, exercise and carbohydrate intake.

## 2019-11-09 LAB
ALBUMIN SERPL-MCNC: 4.4 G/DL (ref 3.5–5.5)
ALBUMIN/CREAT UR: <10.6 MG/G CREAT (ref 0–30)
ALBUMIN/GLOB SERPL: 1.7 {RATIO} (ref 1.2–2.2)
ALP SERPL-CCNC: 68 IU/L (ref 39–117)
ALT SERPL-CCNC: 17 IU/L (ref 0–32)
AST SERPL-CCNC: 19 IU/L (ref 0–40)
BILIRUB SERPL-MCNC: <0.2 MG/DL (ref 0–1.2)
BUN SERPL-MCNC: 9 MG/DL (ref 6–24)
BUN/CREAT SERPL: 15 (ref 9–23)
CALCIUM SERPL-MCNC: 9.3 MG/DL (ref 8.7–10.2)
CHLORIDE SERPL-SCNC: 103 MMOL/L (ref 96–106)
CHOLEST SERPL-MCNC: 128 MG/DL (ref 100–199)
CO2 SERPL-SCNC: 23 MMOL/L (ref 20–29)
CREAT SERPL-MCNC: 0.59 MG/DL (ref 0.57–1)
CREAT UR-MCNC: 28.2 MG/DL
GLOBULIN SER CALC-MCNC: 2.6 G/DL (ref 1.5–4.5)
GLUCOSE SERPL-MCNC: 109 MG/DL (ref 65–99)
HDLC SERPL-MCNC: 56 MG/DL
INTERPRETATION, 910389: NORMAL
LDLC SERPL CALC-MCNC: 34 MG/DL (ref 0–99)
Lab: NORMAL
MICROALBUMIN UR-MCNC: <3 UG/ML
POTASSIUM SERPL-SCNC: 4.1 MMOL/L (ref 3.5–5.2)
PROT SERPL-MCNC: 7 G/DL (ref 6–8.5)
SODIUM SERPL-SCNC: 141 MMOL/L (ref 134–144)
T4 FREE SERPL-MCNC: 1.07 NG/DL (ref 0.82–1.77)
TRIGL SERPL-MCNC: 192 MG/DL (ref 0–149)
TSH SERPL DL<=0.005 MIU/L-ACNC: 2.57 UIU/ML (ref 0.45–4.5)
VLDLC SERPL CALC-MCNC: 38 MG/DL (ref 5–40)

## 2020-01-20 ENCOUNTER — HOSPITAL ENCOUNTER (OUTPATIENT)
Dept: MAMMOGRAPHY | Age: 50
Discharge: HOME | End: 2020-01-20
Attending: PHYSICIAN ASSISTANT
Payer: OTHER GOVERNMENT

## 2020-01-20 DIAGNOSIS — Z12.31 OTHER SCREENING MAMMOGRAM: ICD-10-CM

## 2020-01-20 PROCEDURE — 77067 SCR MAMMO BI INCL CAD: CPT

## 2020-02-20 ENCOUNTER — OFFICE VISIT (OUTPATIENT)
Dept: ENDOCRINOLOGY | Age: 50
End: 2020-02-20

## 2020-02-20 VITALS
DIASTOLIC BLOOD PRESSURE: 71 MMHG | SYSTOLIC BLOOD PRESSURE: 132 MMHG | HEIGHT: 62 IN | WEIGHT: 180 LBS | BODY MASS INDEX: 33.13 KG/M2 | HEART RATE: 84 BPM

## 2020-02-20 DIAGNOSIS — E03.4 HYPOTHYROIDISM DUE TO ACQUIRED ATROPHY OF THYROID: ICD-10-CM

## 2020-02-20 DIAGNOSIS — E11.9 TYPE 2 DIABETES MELLITUS WITHOUT COMPLICATION, WITHOUT LONG-TERM CURRENT USE OF INSULIN (HCC): Primary | ICD-10-CM

## 2020-02-20 DIAGNOSIS — E11.42 TYPE 2 DIABETES MELLITUS WITH DIABETIC POLYNEUROPATHY, WITHOUT LONG-TERM CURRENT USE OF INSULIN (HCC): ICD-10-CM

## 2020-02-20 LAB — HBA1C MFR BLD HPLC: 7.4 %

## 2020-02-20 RX ORDER — LEVOTHYROXINE SODIUM 25 UG/1
TABLET ORAL
Qty: 90 TAB | Refills: 3 | Status: SHIPPED | OUTPATIENT
Start: 2020-02-20 | End: 2020-05-22

## 2020-02-20 RX ORDER — METFORMIN HYDROCHLORIDE 500 MG/1
1000 TABLET, EXTENDED RELEASE ORAL 2 TIMES DAILY
Qty: 360 TAB | Refills: 3 | Status: SHIPPED | OUTPATIENT
Start: 2020-02-20 | End: 2020-09-14 | Stop reason: SDUPTHER

## 2020-02-20 RX ORDER — GLIPIZIDE 10 MG/1
10 TABLET, FILM COATED, EXTENDED RELEASE ORAL 2 TIMES DAILY
Qty: 180 TAB | Refills: 3 | Status: SHIPPED | OUTPATIENT
Start: 2020-02-20

## 2020-02-20 RX ORDER — LISINOPRIL 10 MG/1
10 TABLET ORAL DAILY
Qty: 90 TAB | Refills: 3 | Status: SHIPPED | OUTPATIENT
Start: 2020-02-20 | End: 2020-09-14 | Stop reason: SDUPTHER

## 2020-02-20 RX ORDER — ROSUVASTATIN CALCIUM 5 MG/1
5 TABLET, COATED ORAL
Qty: 90 TAB | Refills: 3 | Status: SHIPPED | OUTPATIENT
Start: 2020-02-20 | End: 2020-09-14 | Stop reason: SDUPTHER

## 2020-02-20 NOTE — PROGRESS NOTES
Ms. Ese Mims returns for follow-up of her type 2 diabetes mellitus with poor blood sugar control.  She has been taking metformin 1000 mg twice daily, glipizide 10 mg, and NPH insulin 40 units at bedtime. Ayush Valdes fasting blood sugars are actually very good and they have been for very long time. Achilles Isela she has not been clear about what her blood sugar does between suppertime and bedtime.  So we asked her to record her diet and her blood sugars before and after dinner.  She did so and it was very illuminating for her.  In general even if she goes into dinner with a blood sugar of 100 by bedtime her blood sugars in the 180-330 range. At our last visit, we switched her from NPH at bedtime to 75/2540 units before supper. Her A1c now has dropped from 7.8% to 7.4% on this regimen. Her blood sugars in the morning now range between 160 and 170 and her predinner blood sugars range between 60 and 90. She feels better and she is very pleased with her blood sugar improvements. She and her  are now going to the gym 3 days a week for an hour first thing in the morning. Her diet continues to be excellent in terms of portion size. Breakfast is usually oatmeal with egg and fruit. Lunch can be a half portion of salad. Yesterday for dinner she had a 10 inch sub-. The night before that she had pasta and broccoli but her blood sugar the next morning was 120. She says that her energy is better and she is sleeping better. Examination  Blood pressure 132/71  Pulse 84  Weight 180    Impression type 2 diabetes mellitus with much improved glucose control on combination therapy  Plan: I did not change the regimen. All prescriptions were refilled we will see her back in 4 months. We spent more than 25 mintutes face to face, more than 50% was in counseling regarding diet, exercise and carbohydrate intake.

## 2020-03-18 ENCOUNTER — TELEPHONE (OUTPATIENT)
Dept: ENDOCRINOLOGY | Age: 50
End: 2020-03-18

## 2020-03-18 NOTE — TELEPHONE ENCOUNTER
Pt called stated, she went to see a Dermatology yesterday for dermatitis and was prescribed prednisone for 19 days. Please advise of what she needs to do with her insulin.  She can be reach @ [phone]

## 2020-03-18 NOTE — TELEPHONE ENCOUNTER
Started on prednisone tablets for 10 mg, 4 x 5days, etc    Advised pt to take 20 units in the AM with 40 mg prednisone.  Will taper over 20 days

## 2020-05-22 DIAGNOSIS — E03.4 HYPOTHYROIDISM DUE TO ACQUIRED ATROPHY OF THYROID: ICD-10-CM

## 2020-05-22 RX ORDER — LEVOTHYROXINE SODIUM 25 UG/1
TABLET ORAL
Qty: 90 TAB | Refills: 3 | Status: SHIPPED | OUTPATIENT
Start: 2020-05-22 | End: 2020-11-24 | Stop reason: SDUPTHER

## 2020-09-14 DIAGNOSIS — E11.42 TYPE 2 DIABETES MELLITUS WITH DIABETIC POLYNEUROPATHY, WITHOUT LONG-TERM CURRENT USE OF INSULIN (HCC): ICD-10-CM

## 2020-09-14 DIAGNOSIS — E11.9 TYPE 2 DIABETES MELLITUS WITHOUT COMPLICATION, WITHOUT LONG-TERM CURRENT USE OF INSULIN (HCC): ICD-10-CM

## 2020-09-14 DIAGNOSIS — E03.4 HYPOTHYROIDISM DUE TO ACQUIRED ATROPHY OF THYROID: Primary | ICD-10-CM

## 2020-09-14 RX ORDER — METFORMIN HYDROCHLORIDE 500 MG/1
1000 TABLET, EXTENDED RELEASE ORAL 2 TIMES DAILY
Qty: 360 TAB | Refills: 3 | Status: SHIPPED | OUTPATIENT
Start: 2020-09-14 | End: 2021-03-14 | Stop reason: SDUPTHER

## 2020-09-14 RX ORDER — LISINOPRIL 10 MG/1
10 TABLET ORAL DAILY
Qty: 90 TAB | Refills: 3 | Status: SHIPPED | OUTPATIENT
Start: 2020-09-14 | End: 2021-11-15

## 2020-09-14 RX ORDER — NAPROXEN SODIUM 220 MG
1 TABLET ORAL DAILY
Qty: 100 SYRINGE | Refills: 3 | Status: SHIPPED | OUTPATIENT
Start: 2020-09-14 | End: 2021-09-15

## 2020-09-14 RX ORDER — ROSUVASTATIN CALCIUM 5 MG/1
5 TABLET, COATED ORAL
Qty: 90 TAB | Refills: 3 | Status: SHIPPED | OUTPATIENT
Start: 2020-09-14 | End: 2021-11-15

## 2020-09-14 NOTE — TELEPHONE ENCOUNTER
Labs ordered per pt's request.   Requested Prescriptions     Pending Prescriptions Disp Refills    Insulin Syringe-Needle U-100 ([facility] Syringes) 0.5 mL 31 gauge x /16\" syrg 100 Syringe 3     Si Each by Does Not Apply route daily.  insulin NPH/insulin regular (NOVOLIN 70/30, HUMULIN 70/30) 100 unit/mL (70-30) injection 50 mL 3     Si units ac dinner only    lisinopriL (PRINIVIL, ZESTRIL) 10 mg tablet 90 Tab 3     Sig: Take 1 Tab by mouth daily.  metFORMIN ER (GLUCOPHAGE XR) 500 mg tablet 360 Tab 3     Sig: Take 2 Tabs by mouth two (2) times a day.  rosuvastatin (CRESTOR) 5 mg tablet 90 Tab 3     Sig: Take 1 Tab by mouth nightly.

## 2020-09-29 ENCOUNTER — TELEPHONE (OUTPATIENT)
Dept: ENDOCRINOLOGY | Age: 50
End: 2020-09-29

## 2020-09-29 LAB
ALBUMIN SERPL-MCNC: 4.3 G/DL (ref 3.8–4.8)
ALBUMIN/CREAT UR: 8 MG/G CREAT (ref 0–29)
ALBUMIN/GLOB SERPL: 1.9 {RATIO} (ref 1.2–2.2)
ALP SERPL-CCNC: 74 IU/L (ref 39–117)
ALT SERPL-CCNC: 17 IU/L (ref 0–32)
AST SERPL-CCNC: 17 IU/L (ref 0–40)
BILIRUB SERPL-MCNC: 0.2 MG/DL (ref 0–1.2)
BUN SERPL-MCNC: 12 MG/DL (ref 6–24)
BUN/CREAT SERPL: 22 (ref 9–23)
CALCIUM SERPL-MCNC: 9.2 MG/DL (ref 8.7–10.2)
CHLORIDE SERPL-SCNC: 101 MMOL/L (ref 96–106)
CHOLEST SERPL-MCNC: 131 MG/DL (ref 100–199)
CO2 SERPL-SCNC: 24 MMOL/L (ref 20–29)
CREAT SERPL-MCNC: 0.55 MG/DL (ref 0.57–1)
CREAT UR-MCNC: 52.8 MG/DL
EST. AVERAGE GLUCOSE BLD GHB EST-MCNC: 192 MG/DL
GLOBULIN SER CALC-MCNC: 2.3 G/DL (ref 1.5–4.5)
GLUCOSE SERPL-MCNC: 150 MG/DL (ref 65–99)
HBA1C MFR BLD: 8.3 % (ref 4.8–5.6)
HDLC SERPL-MCNC: 64 MG/DL
INTERPRETATION, 910389: NORMAL
LDLC SERPL CALC-MCNC: 49 MG/DL (ref 0–99)
Lab: NORMAL
MICROALBUMIN UR-MCNC: 4 UG/ML
POTASSIUM SERPL-SCNC: 4.5 MMOL/L (ref 3.5–5.2)
PROT SERPL-MCNC: 6.6 G/DL (ref 6–8.5)
SODIUM SERPL-SCNC: 136 MMOL/L (ref 134–144)
T4 FREE SERPL-MCNC: 1.01 NG/DL (ref 0.82–1.77)
TRIGL SERPL-MCNC: 97 MG/DL (ref 0–149)
TSH SERPL DL<=0.005 MIU/L-ACNC: 2.92 UIU/ML (ref 0.45–4.5)
VLDLC SERPL CALC-MCNC: 18 MG/DL (ref 5–40)

## 2020-09-29 NOTE — TELEPHONE ENCOUNTER
Spoke to pt. A1c up after being on steroids for 25 days x 2. BS back down 100-120 after stopping steroids. Will try to reschedule appt.

## 2020-11-17 ENCOUNTER — TRANSCRIBE ORDER (OUTPATIENT)
Dept: GENERAL RADIOLOGY | Age: 50
End: 2020-11-17

## 2020-11-17 DIAGNOSIS — N64.4 BREAST PAIN, LEFT: Primary | ICD-10-CM

## 2020-11-17 DIAGNOSIS — N64.4 MASTODYNIA: Primary | ICD-10-CM

## 2020-11-17 DIAGNOSIS — N64.4 BREAST PAIN, RIGHT: ICD-10-CM

## 2020-11-24 DIAGNOSIS — E03.4 HYPOTHYROIDISM DUE TO ACQUIRED ATROPHY OF THYROID: ICD-10-CM

## 2020-11-24 RX ORDER — LEVOTHYROXINE SODIUM 25 UG/1
25 TABLET ORAL
Qty: 90 TAB | Refills: 3 | Status: SHIPPED | OUTPATIENT
Start: 2020-11-24 | End: 2021-03-14 | Stop reason: SDUPTHER

## 2020-11-24 NOTE — TELEPHONE ENCOUNTER
----- Message from Kerri Serrano sent at 11/24/2020  8:38 AM EST -----  Regarding: Dr. Dru Porras refill  Caller (if not patient): n/a  Relationship of caller (if not patient): n/a  Best contact number(s): [phone]  Name of medication and dosage if known:levothyroxine (SYNTHROID) 25 mcg tablet   Is patient out of this medication (yes/no): yes   Pharmacy name: [facility]  Pharmacy listed in chart? (yes/no): yes   Pharmacy phone number:n/a  Date of last visit: 2/20/20  Details to clarify the request: [facility] but she doesn't have any left and needs some to last her until she receives it

## 2020-11-24 NOTE — TELEPHONE ENCOUNTER
Requested Prescriptions     Pending Prescriptions Disp Refills    levothyroxine (SYNTHROID) 25 mcg tablet 90 Tab 3     Sig: Take 1 Tab by mouth Daily (before breakfast).

## 2020-12-01 ENCOUNTER — HOSPITAL ENCOUNTER (OUTPATIENT)
Dept: MAMMOGRAPHY | Age: 50
Discharge: HOME | End: 2020-12-01
Attending: FAMILY MEDICINE
Payer: OTHER GOVERNMENT

## 2020-12-01 DIAGNOSIS — N64.4 MASTODYNIA: ICD-10-CM

## 2020-12-01 PROCEDURE — 77066 DX MAMMO INCL CAD BI: CPT

## 2021-01-13 ENCOUNTER — TELEPHONE (OUTPATIENT)
Dept: ENDOCRINOLOGY | Age: 51
End: 2021-01-13

## 2021-01-13 DIAGNOSIS — E11.9 TYPE 2 DIABETES MELLITUS WITHOUT COMPLICATION, WITHOUT LONG-TERM CURRENT USE OF INSULIN (HCC): Primary | ICD-10-CM

## 2021-01-13 NOTE — TELEPHONE ENCOUNTER
----- Message from Elia sent at 1/13/2021  2:20 PM EST -----  Regarding: Dr. Malia Clarke, telephone  Contact: [phone]  General Message/Vendor Calls    Caller's first and last name: n/a      Reason for call:  Pt wants to obtain new hemoglobin A1c values. Callback required yes/no and why: Yes, to confirm       Best contact number(s):  [phone]      Details to clarify the request:  Pt would like a lab order submitted to [address] [place] [address] to get hemoglobin A1c checked. If pt has to be seen first, pt prefers an in-office visit and declines a virtual visit. Pt's son has same request and separate message was sent for him.        Thanks,   Elia

## 2021-01-13 NOTE — TELEPHONE ENCOUNTER
Farzad Jacinto,    Please call Mrs. Leo Rodriguez to schedule her an in person visit with Dr. Izzy Andrews on a Monday. Please inform her that her and her son's lab order is in the system.        Patient's son will also need an in person visit per her request.

## 2021-01-14 NOTE — TELEPHONE ENCOUNTER
ISAAC Gutierrez,  I called pt to get her an in office appointment and I told her about her and son's lab order. Pt states don't need an appt right now. They just need to get their labs done.

## 2021-01-21 LAB
EST. AVERAGE GLUCOSE BLD GHB EST-MCNC: 189 MG/DL
HBA1C MFR BLD: 8.2 % (ref 4.8–5.6)

## 2021-03-14 DIAGNOSIS — E11.42 TYPE 2 DIABETES MELLITUS WITH DIABETIC POLYNEUROPATHY, WITHOUT LONG-TERM CURRENT USE OF INSULIN (HCC): ICD-10-CM

## 2021-03-14 DIAGNOSIS — E03.4 HYPOTHYROIDISM DUE TO ACQUIRED ATROPHY OF THYROID: ICD-10-CM

## 2021-03-15 DIAGNOSIS — E11.65 TYPE 2 DIABETES MELLITUS WITH HYPERGLYCEMIA, WITHOUT LONG-TERM CURRENT USE OF INSULIN (HCC): Primary | ICD-10-CM

## 2021-03-15 RX ORDER — METFORMIN HYDROCHLORIDE 500 MG/1
1000 TABLET, EXTENDED RELEASE ORAL 2 TIMES DAILY
Qty: 360 TAB | Refills: 3 | Status: SHIPPED | OUTPATIENT
Start: 2021-03-15 | End: 2022-06-06

## 2021-03-15 RX ORDER — LEVOTHYROXINE SODIUM 25 UG/1
25 TABLET ORAL
Qty: 90 TAB | Refills: 3 | Status: SHIPPED | OUTPATIENT
Start: 2021-03-15 | End: 2021-07-10

## 2021-03-15 RX ORDER — GLIPIZIDE 10 MG/1
10 TABLET, FILM COATED, EXTENDED RELEASE ORAL DAILY
Qty: 90 TAB | Refills: 3 | Status: SHIPPED | OUTPATIENT
Start: 2021-03-15 | End: 2021-04-08

## 2021-03-16 DIAGNOSIS — E11.65 TYPE 2 DIABETES MELLITUS WITH HYPERGLYCEMIA, WITHOUT LONG-TERM CURRENT USE OF INSULIN (HCC): ICD-10-CM

## 2021-03-16 RX ORDER — GLIPIZIDE 10 MG/1
10 TABLET, FILM COATED, EXTENDED RELEASE ORAL DAILY
Qty: 90 TAB | Refills: 3 | OUTPATIENT
Start: 2021-03-16

## 2021-03-16 NOTE — TELEPHONE ENCOUNTER
Patient sent a [employer] message asking for us to send her Glipizide prescription to [facility]. It was mistakenly sent to [facility].

## 2021-04-07 DIAGNOSIS — E11.65 TYPE 2 DIABETES MELLITUS WITH HYPERGLYCEMIA, WITHOUT LONG-TERM CURRENT USE OF INSULIN (HCC): ICD-10-CM

## 2021-04-08 RX ORDER — GLIPIZIDE 10 MG/1
TABLET, FILM COATED, EXTENDED RELEASE ORAL
Qty: 180 TAB | Refills: 3 | Status: SHIPPED | OUTPATIENT
Start: 2021-04-08 | End: 2022-04-04

## 2021-04-19 ENCOUNTER — TELEPHONE (OUTPATIENT)
Dept: ENDOCRINOLOGY | Age: 51
End: 2021-04-19

## 2021-04-19 NOTE — TELEPHONE ENCOUNTER
Spoke with Mrs. Ben Carolina and informed her that her and her son's appt for this Friday will be in person. No further actions required.

## 2021-04-19 NOTE — TELEPHONE ENCOUNTER
----- Message from Cierra Aguilar sent at 4/19/2021  4:04 PM EDT -----  Regarding: /Telephone  Caller's first and last name and relationship (if not the patient):PT     Best contact number(s): [phone]    Whose call is being returned:PT     Details to clarify the request: When Pt schedule originally scheduled appt for 04/23/21 , she requested a in office visit. PT was told that would be okay but when she received her reminder phone call, appt said it would be VV. PT called in to confirm but I am also seeing VV. PT has stated she can not do VV, it must be in office. PT would like a phone call to confirm the changes?

## 2021-04-23 ENCOUNTER — OFFICE VISIT (OUTPATIENT)
Dept: ENDOCRINOLOGY | Age: 51
End: 2021-04-23
Payer: OTHER GOVERNMENT

## 2021-04-23 DIAGNOSIS — E03.4 HYPOTHYROIDISM DUE TO ACQUIRED ATROPHY OF THYROID: ICD-10-CM

## 2021-04-23 DIAGNOSIS — E11.65 TYPE 2 DIABETES MELLITUS WITH HYPERGLYCEMIA, WITHOUT LONG-TERM CURRENT USE OF INSULIN (HCC): Primary | ICD-10-CM

## 2021-04-23 PROCEDURE — 3052F HG A1C>EQUAL 8.0%<EQUAL 9.0%: CPT | Performed by: INTERNAL MEDICINE

## 2021-04-23 PROCEDURE — 99214 OFFICE O/P EST MOD 30 MIN: CPT | Performed by: INTERNAL MEDICINE

## 2021-04-23 NOTE — PROGRESS NOTES
Ms. Sb Ramirez returns for follow-up of her type 2 diabetes mellitus with poor blood sugar control.        She has been taking     Current Diabetes Medications  Metformin 1000 mg twice daily,   Glipizide 10 mg, and   NPH insulin 46 units at bedtime. Her fasting blood sugars are actually very good and they have been for very long time. Marlee Sammi she has not been clear about what her blood sugar does between suppertime and bedtime.  So we asked her to record her diet and her blood sugars before and after dinner.  She did so and it was very illuminating for her.  In general even if she goes into dinner with a blood sugar of 100 by bedtime her blood sugars in the 180-330 range.  At our last visit, we switched her from NPH at bedtime to 75/2540 units before supper.  Her A1c now has dropped from 7.8% to 7.4% on this regimen.  Her blood sugars in the morning now range between 160 and 170 and her predinner blood sugars range between 60 and 90.  She feels better and she is very pleased with her blood sugar improvements. She and her  are now going to the gym 3 days a week for an hour first thing in the morning. Her diet continues to be excellent in terms of portion size.     Breakfast is usually oatmeal with egg and fruit. Lunch can be a half portion of salad. Yesterday for dinner she had a 10 inch sub-. The night before that she had pasta and broccoli but her blood sugar the next morning was 120. She says that her energy is better and she is sleeping better. Since I saw her last in February 2020, she was diagnosed with eczema and was treated intermittently with prednisone for about 6 months. She called me with the elevated blood sugars and I added morning doses of NPH insulin to compensate for the steroids. She is not received any steroids since September 2020. Most recent A1c was 8.2% in January 2021.     Examination  Blood pressure 141/77  Pulse 85  Weight 187 (she is gained 7 pounds)  RIMA unremarkable  Lungs clear  Heart reveals a regular rate and rhythm  Abdomen soft and nontender  Extremities unremarkable  Diabetic foot exam:     Left Foot:   Visual Exam: normal    Pulse DP: 2+ (normal)   Filament test: normal sensation    Vibratory sensation: normal      Right Foot:   Visual Exam: normal    Pulse DP: 2+ (normal)   Filament test: normal sensation    Vibratory sensation: normal    Impression  1. Type 2 diabetes mellitus with indeterminate glucose control but likely at or perhaps better than the result in January 2. History of eczema no longer on steroid therapy    Plan:  1. I obtained a hemoglobin A1c and will call her with results.   2.  I will see her back in 4 months face-to-face

## 2021-07-09 DIAGNOSIS — E03.4 HYPOTHYROIDISM DUE TO ACQUIRED ATROPHY OF THYROID: ICD-10-CM

## 2021-07-10 RX ORDER — LEVOTHYROXINE SODIUM 25 UG/1
TABLET ORAL
Qty: 90 TABLET | Refills: 3 | Status: SHIPPED | OUTPATIENT
Start: 2021-07-10 | End: 2022-07-06

## 2021-08-13 ENCOUNTER — OFFICE VISIT (OUTPATIENT)
Dept: ENDOCRINOLOGY | Age: 51
End: 2021-08-13
Payer: OTHER GOVERNMENT

## 2021-08-13 VITALS
SYSTOLIC BLOOD PRESSURE: 123 MMHG | HEART RATE: 93 BPM | DIASTOLIC BLOOD PRESSURE: 66 MMHG | HEIGHT: 62 IN | BODY MASS INDEX: 33.6 KG/M2 | WEIGHT: 182.6 LBS

## 2021-08-13 DIAGNOSIS — E03.4 HYPOTHYROIDISM DUE TO ACQUIRED ATROPHY OF THYROID: ICD-10-CM

## 2021-08-13 DIAGNOSIS — E11.65 TYPE 2 DIABETES MELLITUS WITH HYPERGLYCEMIA, WITHOUT LONG-TERM CURRENT USE OF INSULIN (HCC): Primary | ICD-10-CM

## 2021-08-13 LAB — HBA1C MFR BLD HPLC: 7.6 %

## 2021-08-13 PROCEDURE — 99214 OFFICE O/P EST MOD 30 MIN: CPT | Performed by: INTERNAL MEDICINE

## 2021-08-13 PROCEDURE — 3051F HG A1C>EQUAL 7.0%<8.0%: CPT | Performed by: INTERNAL MEDICINE

## 2021-08-13 PROCEDURE — 83036 HEMOGLOBIN GLYCOSYLATED A1C: CPT | Performed by: INTERNAL MEDICINE

## 2021-08-13 NOTE — PROGRESS NOTES
Ms. Manad Rubio returns for follow-up of her type 2 diabetes mellitus with previously poor blood sugar control which is improved over the last several years. Cami Burdick     Current Diabetes Medications  Metformin 1000 mg twice daily,   Glipizide 10 mg, and   70/30  insulin 47 units at dinner.      Her fasting blood sugars are actually very good and they have been for very long time. Carroll Guerrero she has not been clear about what her blood sugar does between suppertime and bedtime.  So we asked her to record her diet and her blood sugars before and after dinner.  She did so and it was very illuminating for her.  In general even if she goes into dinner with a blood sugar of 100 by bedtime her blood sugars in the 180-330 range.  At our last visit, we switched her from NPH at bedtime to 75/25 40 units before supper. Her A1c now has dropped from 7.8% to 7.4% on this regimen.  Her A1c today is 7.6%. Her blood sugars in the morning now range between  and her predinner blood sugars range between 60 and 90.  She feels better and she is very pleased with her blood sugar improvements.  She and her  are now going to the gym 3 days a week for an hour first thing in the morning.  Her diet continues to be excellent in terms of portion size.     Breakfast is usually oatmeal with egg and fruit.  Lunch can be a half portion of salad.  Yesterday for dinner she had a 10 inch sub-.  The night before that she had pasta and broccoli but her blood sugar the next morning was 120.  She says that her energy is better and she is sleeping better.     Since I saw her last in February 2020, she was diagnosed with eczema and was treated intermittently with prednisone for about 6 months. She called me with the elevated blood sugars and I added morning doses of NPH insulin to compensate for the steroids. She is not received any steroids since September 2020.       Examination  Blood pressure 123/66  Pulse 76  Weight 182  BMI 33.4  RIMA unremarkable  Lungs clear  Heart reveals a regular rate and rhythm  Abdomen benign  Extremities unremarkable  Diabetic foot exam:     Left Foot:   Visual Exam: normal    Pulse DP: 2+ (normal)   Filament test: normal sensation    Vibratory sensation: normal      Right Foot:   Visual Exam: normal    Pulse DP: 2+ (normal)   Filament test: normal sensation    Vibratory sensation: normal    Pression  1. Type 2 diabetes mellitus with an A1c of 7.6% on combination therapy  2. Obesity    Plan:  1. I have ordered all screening labs today  2. We will continue the current insulin dosing  3. I will see her back in 4 months.

## 2021-11-15 DIAGNOSIS — E11.9 TYPE 2 DIABETES MELLITUS WITHOUT COMPLICATION, WITHOUT LONG-TERM CURRENT USE OF INSULIN (HCC): ICD-10-CM

## 2021-11-15 DIAGNOSIS — E11.42 TYPE 2 DIABETES MELLITUS WITH DIABETIC POLYNEUROPATHY, WITHOUT LONG-TERM CURRENT USE OF INSULIN (HCC): ICD-10-CM

## 2021-11-15 RX ORDER — ROSUVASTATIN CALCIUM 5 MG/1
TABLET, COATED ORAL
Qty: 90 TABLET | Refills: 3 | Status: SHIPPED | OUTPATIENT
Start: 2021-11-15 | End: 2022-08-10 | Stop reason: SDUPTHER

## 2021-11-15 RX ORDER — LISINOPRIL 10 MG/1
TABLET ORAL
Qty: 90 TABLET | Refills: 3 | Status: SHIPPED | OUTPATIENT
Start: 2021-11-15

## 2021-11-24 ENCOUNTER — TRANSCRIBE ORDER (OUTPATIENT)
Dept: SCHEDULING | Age: 51
End: 2021-11-24

## 2021-11-24 DIAGNOSIS — Z12.31 SCREENING MAMMOGRAM FOR HIGH-RISK PATIENT: Primary | ICD-10-CM

## 2021-12-13 ENCOUNTER — OFFICE VISIT (OUTPATIENT)
Dept: ENDOCRINOLOGY | Age: 51
End: 2021-12-13
Payer: OTHER GOVERNMENT

## 2021-12-13 VITALS
BODY MASS INDEX: 33.79 KG/M2 | SYSTOLIC BLOOD PRESSURE: 124 MMHG | WEIGHT: 183.6 LBS | HEIGHT: 62 IN | DIASTOLIC BLOOD PRESSURE: 74 MMHG | HEART RATE: 88 BPM

## 2021-12-13 DIAGNOSIS — E11.42 TYPE 2 DIABETES MELLITUS WITH DIABETIC POLYNEUROPATHY, WITHOUT LONG-TERM CURRENT USE OF INSULIN (HCC): Primary | ICD-10-CM

## 2021-12-13 LAB — HBA1C MFR BLD HPLC: 8.1 %

## 2021-12-13 PROCEDURE — 3052F HG A1C>EQUAL 8.0%<EQUAL 9.0%: CPT | Performed by: INTERNAL MEDICINE

## 2021-12-13 PROCEDURE — 99214 OFFICE O/P EST MOD 30 MIN: CPT | Performed by: INTERNAL MEDICINE

## 2021-12-13 PROCEDURE — 83036 HEMOGLOBIN GLYCOSYLATED A1C: CPT | Performed by: INTERNAL MEDICINE

## 2021-12-13 NOTE — PROGRESS NOTES
Ms. Alphonso Redd returns for follow-up of her type 2 diabetes mellitus with previously poor blood sugar control which is improved over the last several years. Evi Swift     Current Diabetes Medications  Metformin 1000 mg twice daily,   Glipizide 10 mg,  70/30  insulin 47 units at dinner.      I saw her last her A1c was down to 7.6%. Unfortunately it is 8.1% today. In conversation today it became clear that she is not taking the insulin before dinner. Rather she is taking the insulin when she goes to bed at night. This is because she has long days and her  does not get off work and so they do not have dinner until late. So she forgets to take the insulin until 2 hours after dinner. She takes the insulin goes to bed. Blood sugars in the morning can be 1 . She occasionally checks blood sugars before dinner and they can be in the 90-1 20 range. She admits that she is hungry 1 dinnertime comes in she eats. Breakfast is oatmeal or a muffin or bagel. Lunch can be a soup or salad. Dinner can be soup or salad. Last night she had soup mashed potatoes and cheesecake. She occasionally snacks before dinner again because she is hungry.       Her blood sugars in the morning now range between  and her predinner blood sugars range between 60 and 90. Examination  Blood pressure 124/74  Pulse 80  Weight 183  BMI 33.6  HEENT unremarkable  Lungs clear  Heart reveals a regular rate and rhythm  Abdomen obese  Extremities unremarkable  Diabetic foot exam:     Left Foot:   Visual Exam: normal    Pulse DP: 2+ (normal)   Filament test: normal sensation    Vibratory sensation: normal      Right Foot:   Visual Exam: normal    Pulse DP: 2+ (normal)   Filament test: normal sensation    Vibratory sensation: normal    Impression  1. Type 2 diabetes mellitus with deteriorating glucose control  2. Obesity    Plan:  1.   I strongly encouraged her to take her insulin dose before dinner so that the blood sugar does not spike so much with her meal which is the largest of the day  2. We discussed options. She is very concerned that her insurance would not cover anything other than what were already doing  3. I did encourage her to check blood sugars at bedtime to at least have a guide as to whether the insulin was effective at preventing spikes in blood sugar and the elevated A1c  4.   I will see her back in 3-month

## 2021-12-20 ENCOUNTER — HOSPITAL ENCOUNTER (OUTPATIENT)
Dept: MAMMOGRAPHY | Age: 51
Discharge: HOME | End: 2021-12-20
Payer: OTHER GOVERNMENT

## 2021-12-20 DIAGNOSIS — Z12.31 SCREENING MAMMOGRAM FOR HIGH-RISK PATIENT: ICD-10-CM

## 2021-12-20 PROCEDURE — 77067 SCR MAMMO BI INCL CAD: CPT

## 2022-03-28 ENCOUNTER — OFFICE VISIT (OUTPATIENT)
Dept: ENDOCRINOLOGY | Age: 52
End: 2022-03-28
Payer: OTHER GOVERNMENT

## 2022-03-28 VITALS
WEIGHT: 182.8 LBS | DIASTOLIC BLOOD PRESSURE: 73 MMHG | HEART RATE: 92 BPM | HEIGHT: 62 IN | SYSTOLIC BLOOD PRESSURE: 135 MMHG | BODY MASS INDEX: 33.64 KG/M2

## 2022-03-28 DIAGNOSIS — E11.42 TYPE 2 DIABETES MELLITUS WITH DIABETIC POLYNEUROPATHY, WITHOUT LONG-TERM CURRENT USE OF INSULIN (HCC): Primary | ICD-10-CM

## 2022-03-28 LAB — HBA1C MFR BLD HPLC: 7.8 %

## 2022-03-28 PROCEDURE — 3051F HG A1C>EQUAL 7.0%<8.0%: CPT | Performed by: INTERNAL MEDICINE

## 2022-03-28 PROCEDURE — 83036 HEMOGLOBIN GLYCOSYLATED A1C: CPT | Performed by: INTERNAL MEDICINE

## 2022-03-28 PROCEDURE — 99214 OFFICE O/P EST MOD 30 MIN: CPT | Performed by: INTERNAL MEDICINE

## 2022-03-28 NOTE — PROGRESS NOTES
Ms. Pope Goes returns for follow-up of her type 2 diabetes mellitus with previously poor blood sugar control which is improved over the last several years. Hoda Glezch     Current Diabetes Medications  Metformin 1000 mg twice daily,   Glipizide 10 mg am and pm  70/30  insulin 47 units at dinner.      When I  saw her last her A1c was down to 7.6%. Unfortunately it is 7.8% today. In conversation today it became clear that she is not taking the insulin before dinner. Rather she is taking the insulin when she goes to bed at night. This is because she has long days and her  does not get off work and so they do not have dinner until late. So she forgets to take the insulin until 2 hours after dinner. She takes the insulin goes to bed. Blood sugars in the morning can be 1 . She occasionally checks blood sugars before dinner and they can be in the 90-1 20 range. She admits that she is hungry 1 dinnertime comes in she eats. In addition, the whole family was sick with Covid for 6 weeks and so she was struggling to keep her blood sugar under control. Her diet is really unchanged and she is definitely back to her usual schedule.     Breakfast is oatmeal or a muffin or bagel. Lunch can be a soup or salad. Dinner can be soup or salad. Last night she had soup mashed potatoes and cheesecake. She occasionally snacks before dinner again because she is hungry.       Her blood sugars in the morning now range between  and her predinner blood sugars range between 60 and 90.     Examination  Blood pressure 135/73  Pulse 64  Weight 182  BMI 33.4  HEENT unremarkable  Lungs clear  Heart reveals a regular rate and rhythm  Abdomen benign although obese  Extremities unremarkable  Diabetic foot exam:     Left Foot:   Visual Exam: normal    Pulse DP: 2+ (normal)   Filament test: normal sensation    Vibratory sensation: normal      Right Foot:   Visual Exam: normal    Pulse DP: 2+ (normal)   Filament test: normal sensation Vibratory sensation: normal    Impression  1. Type 2 diabetes mellitus with an A1c of 7.8% on combination therapy  2. History of recent Covid infection lasting 6 weeks but now back to baseline    Plan:  1. We did not change the regimen. Her diet is back to her usual and I would anticipate her physical activity and diet regimen will result in a much better A1c at her next visit  3. I will see her back in 3 months.

## 2022-04-04 DIAGNOSIS — E11.65 TYPE 2 DIABETES MELLITUS WITH HYPERGLYCEMIA, WITHOUT LONG-TERM CURRENT USE OF INSULIN (HCC): ICD-10-CM

## 2022-04-04 RX ORDER — GLIPIZIDE 10 MG/1
TABLET, FILM COATED, EXTENDED RELEASE ORAL
Qty: 180 TABLET | Refills: 3 | Status: SHIPPED | OUTPATIENT
Start: 2022-04-04

## 2022-06-06 DIAGNOSIS — E11.42 TYPE 2 DIABETES MELLITUS WITH DIABETIC POLYNEUROPATHY, WITHOUT LONG-TERM CURRENT USE OF INSULIN (HCC): ICD-10-CM

## 2022-06-06 RX ORDER — METFORMIN HYDROCHLORIDE 500 MG/1
TABLET, EXTENDED RELEASE ORAL
Qty: 360 TABLET | Refills: 3 | Status: SHIPPED | OUTPATIENT
Start: 2022-06-06

## 2022-07-01 ENCOUNTER — OFFICE VISIT (OUTPATIENT)
Dept: ENDOCRINOLOGY | Age: 52
End: 2022-07-01
Payer: OTHER GOVERNMENT

## 2022-07-01 VITALS
WEIGHT: 183 LBS | DIASTOLIC BLOOD PRESSURE: 66 MMHG | HEART RATE: 88 BPM | HEIGHT: 62 IN | SYSTOLIC BLOOD PRESSURE: 130 MMHG | BODY MASS INDEX: 33.68 KG/M2

## 2022-07-01 DIAGNOSIS — E11.42 TYPE 2 DIABETES MELLITUS WITH DIABETIC POLYNEUROPATHY, WITHOUT LONG-TERM CURRENT USE OF INSULIN (HCC): Primary | ICD-10-CM

## 2022-07-01 LAB — HBA1C MFR BLD HPLC: 8 %

## 2022-07-01 PROCEDURE — 99214 OFFICE O/P EST MOD 30 MIN: CPT | Performed by: INTERNAL MEDICINE

## 2022-07-01 PROCEDURE — 3052F HG A1C>EQUAL 8.0%<EQUAL 9.0%: CPT | Performed by: INTERNAL MEDICINE

## 2022-07-01 PROCEDURE — 83036 HEMOGLOBIN GLYCOSYLATED A1C: CPT | Performed by: INTERNAL MEDICINE

## 2022-07-01 NOTE — PROGRESS NOTES
Ms. Darlin Martin returns for follow-up of her type 2 diabetes mellitus with previously poor blood sugar control which is improved over the last several years. Kimo Hatfield     Current Diabetes Medications  Metformin 1000 mg twice daily,   Glipizide 10 mg am and pm  70/30  insulin 47 units at dinner.      When I  saw her last her A1c was down to 7.6%.  Unfortunately it is 8.0% today.  In conversation today it became clear that she is not taking the insulin before dinner. [place]ug[place] she is taking the insulin when she goes to bed at night.  This is because she has long days and her  does not get off work and so they do not have dinner until late.  So she forgets to take the insulin until 2 hours after dinner.  She takes the insulin goes to bed.  Blood sugars in the morning can be 1 .  She occasionally checks blood sugars before dinner and they can be in the 90-1 20 range.  She admits that she is hungry 1 dinnertime comes in she eats. In addition, the whole family was sick with Covid for 6 weeks and so she was struggling to keep her blood sugar under control. Her diet is really unchanged and she is definitely back to her usual schedule.     Breakfast is oatmeal or a muffin or bagel.  Lunch can be a soup or salad.  Dinner can be soup or salad.  Last night she had soup mashed potatoes and cheesecake.  She occasionally snacks before dinner again because she is hungry.       Her blood sugars in the morning now range between  and her predinner blood sugars range between 60 and 90. Examination  Blood pressure 130/66  Pulse 64  Weight 183  BMI 33.5    Impression  1. Type 2 diabetes mellitus with an A1c of 8% but blood sugars that are significantly better than that at least in the morning. 2.  Obesity new    Plan:  1. We will obtain a serum fructosamine as well as other screening laboratory test today and I will call her with the results.   2.  As noted on her son's note, her  was noted to have a glycation gap at his primary care provider's office and so she is wondering whether the A1c is accurately reflecting her blood sugar control  3. If it is accurate, we will likely add a dose of insulin in the morning and perhaps decrease the evening dose. I did also suggest that she check blood sugars before dinner to be sure that the blood sugars are accurate throughout the day and not rising during the day  4. I will see her back in 3 months.     ADDENDUM: Fructosamine 318 c/w A1c of 7.0%.  Significant glycation gap

## 2022-07-05 DIAGNOSIS — E03.4 HYPOTHYROIDISM DUE TO ACQUIRED ATROPHY OF THYROID: ICD-10-CM

## 2022-07-06 RX ORDER — LEVOTHYROXINE SODIUM 25 UG/1
TABLET ORAL
Qty: 90 TABLET | Refills: 3 | Status: SHIPPED | OUTPATIENT
Start: 2022-07-06 | End: 2022-08-10 | Stop reason: SDUPTHER

## 2022-09-07 ENCOUNTER — TRANSCRIBE ORDER (OUTPATIENT)
Dept: SCHEDULING | Age: 52
End: 2022-09-07

## 2022-09-07 DIAGNOSIS — Z12.31 BREAST CANCER SCREENING BY MAMMOGRAM: Primary | ICD-10-CM

## 2022-10-21 ENCOUNTER — OFFICE VISIT (OUTPATIENT)
Dept: ENDOCRINOLOGY | Age: 52
End: 2022-10-21
Payer: OTHER GOVERNMENT

## 2022-10-21 VITALS
DIASTOLIC BLOOD PRESSURE: 66 MMHG | WEIGHT: 179.8 LBS | HEART RATE: 95 BPM | SYSTOLIC BLOOD PRESSURE: 108 MMHG | HEIGHT: 62 IN | BODY MASS INDEX: 33.09 KG/M2

## 2022-10-21 DIAGNOSIS — E11.42 TYPE 2 DIABETES MELLITUS WITH DIABETIC POLYNEUROPATHY, WITHOUT LONG-TERM CURRENT USE OF INSULIN (HCC): Primary | ICD-10-CM

## 2022-10-21 PROCEDURE — 99214 OFFICE O/P EST MOD 30 MIN: CPT | Performed by: INTERNAL MEDICINE

## 2022-10-21 PROCEDURE — 3052F HG A1C>EQUAL 8.0%<EQUAL 9.0%: CPT | Performed by: INTERNAL MEDICINE

## 2022-10-21 NOTE — PROGRESS NOTES
Ms. Pope Goes returns for follow-up of her type 2 diabetes mellitus with previously poor blood sugar control which is improved over the last several years. .       Current Diabetes Medications  Metformin 1000 mg twice daily,   Glipizide 10 mg am and pm  70/30  insulin 47 units at dinner. When I  saw her last her A1c was 8.0%. However, because her blood sugars in her meter were much better than that, I did a serum fructosamine which indicated a significant glycation gap. Fructosamine 318 c/w A1c of 7.0%. She presents today continuing to do very well. Blood sugars average 125. She continues to take the medications as noted above. In conversation today it became clear that she is not taking the insulin before dinner. Rather she is taking the insulin when she goes to bed at night. This is because she has long days and her  does not get off work and so they do not have dinner until late. So she forgets to take the insulin until 2 hours after dinner. She takes the insulin goes to bed. Blood sugars in the morning can be . She occasionally checks blood sugars before dinner and they can be in the 90-1 20 range. Breakfast is oatmeal or a muffin or bagel. Lunch can be a soup or salad. Dinner can be soup or salad. Last night she had soup mashed potatoes and cheesecake. She occasionally snacks before dinner again because she is hungry. Examination  Blood pressure 108/66  Pulse 80  Weight 179  BMI 32.9  HEENT unremarkable  Lungs clear  Heart reveals a regular rate and rhythm on abdomen obese  Extremities unremarkable  Diabetic foot exam:     Left Foot:   Visual Exam: normal    Pulse DP: 2+ (normal)   Filament test: normal sensation    Vibratory sensation: normal      Right Foot:   Visual Exam: normal    Pulse DP: 2+ (normal)   Filament test: normal sensation    Vibratory sensation: normal     Pression  1.   Type 2 diabetes mellitus with improved glucose control based on her blood sugars and the documented glycation gap  2. Glycation gap  3. Obesity    Plan:  1. I have continue the above regimen  2. I obtained a serum fructosamine today and I will call her. Fructosamine c/w A1c 6.7%. 3.  I will see her and her son back in 3 months.

## 2022-11-10 DIAGNOSIS — E11.42 TYPE 2 DIABETES MELLITUS WITH DIABETIC POLYNEUROPATHY, WITHOUT LONG-TERM CURRENT USE OF INSULIN (HCC): ICD-10-CM

## 2022-11-10 RX ORDER — LISINOPRIL 10 MG/1
TABLET ORAL
Qty: 90 TABLET | Refills: 3 | Status: SHIPPED | OUTPATIENT
Start: 2022-11-10

## 2022-12-09 DIAGNOSIS — E11.65 TYPE 2 DIABETES MELLITUS WITH HYPERGLYCEMIA, WITHOUT LONG-TERM CURRENT USE OF INSULIN (HCC): ICD-10-CM

## 2022-12-14 RX ORDER — GLIPIZIDE 10 MG/1
10 TABLET, FILM COATED, EXTENDED RELEASE ORAL 2 TIMES DAILY
Qty: 180 TABLET | Refills: 3 | Status: SHIPPED | OUTPATIENT
Start: 2022-12-14

## 2022-12-22 ENCOUNTER — HOSPITAL ENCOUNTER (OUTPATIENT)
Dept: MAMMOGRAPHY | Age: 52
Discharge: HOME | End: 2022-12-22
Payer: OTHER GOVERNMENT

## 2022-12-22 DIAGNOSIS — Z12.31 BREAST CANCER SCREENING BY MAMMOGRAM: ICD-10-CM

## 2022-12-22 PROCEDURE — 77067 SCR MAMMO BI INCL CAD: CPT

## 2023-02-03 ENCOUNTER — OFFICE VISIT (OUTPATIENT)
Dept: ENDOCRINOLOGY | Age: 53
End: 2023-02-03
Payer: OTHER GOVERNMENT

## 2023-02-03 VITALS
WEIGHT: 182.6 LBS | DIASTOLIC BLOOD PRESSURE: 67 MMHG | SYSTOLIC BLOOD PRESSURE: 131 MMHG | HEART RATE: 82 BPM | HEIGHT: 62 IN | BODY MASS INDEX: 33.6 KG/M2

## 2023-02-03 DIAGNOSIS — E11.65 TYPE 2 DIABETES MELLITUS WITH HYPERGLYCEMIA, WITHOUT LONG-TERM CURRENT USE OF INSULIN (HCC): Primary | ICD-10-CM

## 2023-02-03 DIAGNOSIS — E03.4 HYPOTHYROIDISM DUE TO ACQUIRED ATROPHY OF THYROID: ICD-10-CM

## 2023-02-03 NOTE — PROGRESS NOTES
Ms. Cher De León returns for follow-up of her type 2 diabetes mellitus with previously poor blood sugar control which is improved over the last several years. .       Current Diabetes Medications  Metformin 1000 mg twice daily,   Glipizide 10 mg am and pm  70/30  insulin 47 units at dinner. When I  saw her last her A1c was 8.0%. However, because her blood sugars in her meter were much better than that, I did a serum fructosamine which indicated a significant glycation gap. Fructosamine 318 c/w A1c of 7.0%. She presents today continuing to do very well. Blood sugars average 125. She continues to take the medications as noted above. In conversation today it became clear that she is not taking the insulin before dinner. Rather she is taking the insulin when she goes to bed at night. This is because she has long days and her  does not get off work and so they do not have dinner until late. So she forgets to take the insulin until 2 hours after dinner. She takes the insulin goes to bed. Blood sugars in the morning can be . She occasionally checks blood sugars before dinner and they can be in the 90-1 20 range. Breakfast is oatmeal or a muffin or bagel. Lunch can be a soup or salad. Dinner can be soup or salad. Last night she had soup mashed potatoes and cheesecake. She occasionally snacks before dinner again because she is hungry. Examination  Blood pressure 131/67  Pulse 80 and regular  Weight 182  BMI 33.4  HEENT unremarkable  Lungs clear  Heart reveals a regular rate and rhythm  Abdomen benign although obese  Extremities remarkable for a healing burn on the medial aspect of the left foot  Diabetic foot exam:     Left Foot:   Visual Exam: normal    Pulse DP: 2+ (normal)   Filament test: normal sensation    Vibratory sensation: normal      Right Foot:   Visual Exam: normal    Pulse DP: 2+ (normal)   Filament test: normal sensation    Vibratory sensation: normal      Impression  1.   Type 2 diabetes mellitus with an average blood sugar of 118 on combination therapy. I anticipate her fructosamine being equally good  2. Scheduled for colonoscopy in March    Plan:  1. All laboratory tests were obtained today and I will call her with the results  2. We continue the medications as above  3.   I will see her back in 3 months

## 2023-03-15 ENCOUNTER — ANESTHESIA EVENT (OUTPATIENT)
Dept: ENDOSCOPY | Age: 53
End: 2023-03-15
Payer: OTHER GOVERNMENT

## 2023-03-15 ENCOUNTER — ANESTHESIA (OUTPATIENT)
Dept: ENDOSCOPY | Age: 53
End: 2023-03-15
Payer: OTHER GOVERNMENT

## 2023-03-15 ENCOUNTER — HOSPITAL ENCOUNTER (OUTPATIENT)
Age: 53
Setting detail: OUTPATIENT SURGERY
Discharge: HOME | End: 2023-03-15
Attending: SPECIALIST | Admitting: SPECIALIST
Payer: OTHER GOVERNMENT

## 2023-03-15 VITALS
TEMPERATURE: 97.3 F | WEIGHT: 179.2 LBS | RESPIRATION RATE: 18 BRPM | BODY MASS INDEX: 33.83 KG/M2 | DIASTOLIC BLOOD PRESSURE: 65 MMHG | HEART RATE: 91 BPM | SYSTOLIC BLOOD PRESSURE: 146 MMHG | HEIGHT: 61 IN | OXYGEN SATURATION: 100 %

## 2023-03-15 LAB
GLUCOSE BLD STRIP.AUTO-MCNC: 188 MG/DL (ref 65–117)
SERVICE CMNT-IMP: ABNORMAL

## 2023-03-15 PROCEDURE — 76060000031 HC ANESTHESIA FIRST 0.5 HR: Performed by: SPECIALIST

## 2023-03-15 PROCEDURE — 82962 GLUCOSE BLOOD TEST: CPT

## 2023-03-15 PROCEDURE — 74011000250 HC RX REV CODE- 250: Performed by: NURSE ANESTHETIST, CERTIFIED REGISTERED

## 2023-03-15 PROCEDURE — 74011250636 HC RX REV CODE- 250/636: Performed by: SPECIALIST

## 2023-03-15 PROCEDURE — 74011250636 HC RX REV CODE- 250/636: Performed by: NURSE ANESTHETIST, CERTIFIED REGISTERED

## 2023-03-15 PROCEDURE — 2709999900 HC NON-CHARGEABLE SUPPLY: Performed by: SPECIALIST

## 2023-03-15 PROCEDURE — 76040000019: Performed by: SPECIALIST

## 2023-03-15 RX ORDER — SODIUM CHLORIDE 0.9 % (FLUSH) 0.9 %
5-40 SYRINGE (ML) INJECTION EVERY 8 HOURS
Status: DISCONTINUED | OUTPATIENT
Start: 2023-03-15 | End: 2023-03-15 | Stop reason: HOSPADM

## 2023-03-15 RX ORDER — SODIUM CHLORIDE 9 MG/ML
50 INJECTION, SOLUTION INTRAVENOUS CONTINUOUS
Status: DISCONTINUED | OUTPATIENT
Start: 2023-03-15 | End: 2023-03-15 | Stop reason: HOSPADM

## 2023-03-15 RX ORDER — LIDOCAINE HYDROCHLORIDE 20 MG/ML
INJECTION, SOLUTION EPIDURAL; INFILTRATION; INTRACAUDAL; PERINEURAL AS NEEDED
Status: DISCONTINUED | OUTPATIENT
Start: 2023-03-15 | End: 2023-03-15 | Stop reason: HOSPADM

## 2023-03-15 RX ORDER — SODIUM CHLORIDE 0.9 % (FLUSH) 0.9 %
5-40 SYRINGE (ML) INJECTION AS NEEDED
Status: DISCONTINUED | OUTPATIENT
Start: 2023-03-15 | End: 2023-03-15 | Stop reason: HOSPADM

## 2023-03-15 RX ORDER — DEXTROMETHORPHAN/PSEUDOEPHED 2.5-7.5/.8
1.2 DROPS ORAL
Status: DISCONTINUED | OUTPATIENT
Start: 2023-03-15 | End: 2023-03-15 | Stop reason: HOSPADM

## 2023-03-15 RX ORDER — PROPOFOL 10 MG/ML
INJECTION, EMULSION INTRAVENOUS AS NEEDED
Status: DISCONTINUED | OUTPATIENT
Start: 2023-03-15 | End: 2023-03-15 | Stop reason: HOSPADM

## 2023-03-15 RX ADMIN — PROPOFOL 50 MG: 10 INJECTION, EMULSION INTRAVENOUS at 09:17

## 2023-03-15 RX ADMIN — LIDOCAINE HYDROCHLORIDE 100 MG: 20 INJECTION, SOLUTION INTRAVENOUS at 09:14

## 2023-03-15 RX ADMIN — PROPOFOL 100 MG: 10 INJECTION, EMULSION INTRAVENOUS at 09:14

## 2023-03-15 RX ADMIN — PROPOFOL 50 MG: 10 INJECTION, EMULSION INTRAVENOUS at 09:20

## 2023-03-15 RX ADMIN — SODIUM CHLORIDE 50 ML/HR: 9 INJECTION, SOLUTION INTRAVENOUS at 08:28

## 2023-03-15 RX ADMIN — PROPOFOL 50 MG: 10 INJECTION, EMULSION INTRAVENOUS at 09:23

## 2023-03-15 NOTE — ANESTHESIA PREPROCEDURE EVALUATION
Anesthetic History   No history of anesthetic complications            Review of Systems / Medical History  Patient summary reviewed, nursing notes reviewed and pertinent labs reviewed    Pulmonary  Within defined limits        Undiagnosed apnea         Neuro/Psych         Headaches     Cardiovascular    Hypertension              Exercise tolerance: >4 METS     GI/Hepatic/Renal  Within defined limits              Endo/Other    Diabetes  Hypothyroidism  Obesity     Other Findings            Physical Exam    Airway  Mallampati: III  TM Distance: 4 - 6 cm  Neck ROM: normal range of motion   Mouth opening: Normal     Cardiovascular  Regular rate and rhythm,  S1 and S2 normal,  no murmur, click, rub, or gallop             Dental         Pulmonary  Breath sounds clear to auscultation               Abdominal  GI exam deferred       Other Findings            Anesthetic Plan    ASA: 2  Anesthesia type: MAC          Induction: Intravenous  Anesthetic plan and risks discussed with: Patient

## 2023-03-15 NOTE — ANESTHESIA POSTPROCEDURE EVALUATION
Procedure(s):  COLONOSCOPY. MAC    Anesthesia Post Evaluation        Patient location during evaluation: PACU  Note status: Adequate. Level of consciousness: responsive to verbal stimuli and sleepy but conscious  Pain management: satisfactory to patient  Airway patency: patent  Anesthetic complications: no  Cardiovascular status: acceptable  Respiratory status: acceptable  Hydration status: acceptable  Comments: +Post-Anesthesia Evaluation and Assessment    Patient: Yadiel Giraldo MRN: [id number]  SSN: [id number]   YOB: 1970  Age: 50 y.o. Sex: female      Cardiovascular Function/Vital Signs    BP (!) 146/65   Pulse 91   Temp 36.3 °C (97.3 °F)   Resp 18   Ht 5' 1\" (1.549 m)   Wt 81.3 kg (179 lb 3.2 oz)   SpO2 100%   Breastfeeding No   BMI 33.86 kg/m²     Patient is status post Procedure(s):  COLONOSCOPY. Nausea/Vomiting: Controlled. Postoperative hydration reviewed and adequate. Pain:  Pain Scale 1: Numeric (0 - 10) (03/15/23 0945)  Pain Intensity 1: 0 (03/15/23 0945)   Managed. Neurological Status: At baseline. Mental Status and Level of Consciousness: Arousable. Pulmonary Status:   O2 Device: None (Room air) (03/15/23 0945)   Adequate oxygenation and airway patent. Complications related to anesthesia: None    Post-anesthesia assessment completed. No concerns. Signed By: Tamara Baumann MD    3/15/2023  Post anesthesia nausea and vomiting:  controlled      INITIAL Post-op Vital signs:   Vitals Value Taken Time   /70 03/15/23 0948   Temp 36.3 °C (97.3 °F) 03/15/23 0937   Pulse 84 03/15/23 0950   Resp 15 03/15/23 0950   SpO2 100 % 03/15/23 0950   Vitals shown include unvalidated device data.

## 2023-03-15 NOTE — ROUTINE PROCESS
TRANSFER - IN REPORT:    Verbal report received from Caity(name) on Livia Y Alvenia Meeter  being received from endo(unit) for routine progression of care      Report consisted of patients Situation, Background, Assessment and   Recommendations(SBAR). Information from the following report(s) SBAR, Procedure Summary, and MAR was reviewed with the receiving nurse. Opportunity for questions and clarification was provided. Assessment completed upon patients arrival to unit and care assumed.

## 2023-03-15 NOTE — DISCHARGE INSTRUCTIONS
Scott Abraham  [id number]  1970    COLON DISCHARGE INSTRUCTIONS  Discomfort:  Redness at IV site- apply warm compress to area; if redness or soreness persist- contact your physician  There may be a slight amount of blood passed from the rectum  Gaseous discomfort- walking, belching will help relieve any discomfort  You may not operate a vehicle for 12 hours  You may not engage in an occupation involving machinery or appliances for rest of today  You may not drink alcoholic beverages for at least 12 hours  Avoid making any critical decisions for at least 24 hour  DIET:   Regular diet. - however -  remember your colon is empty and a heavy meal will produce gas. Avoid these foods:  vegetables, fried / greasy foods, carbonated drinks for today. MEDICATIONS:        Regarding Aspirin or Nonsteroidal medications, please see below. ACTIVITY:  You may resume your normal daily activities it is recommended that you spend the remainder of the day resting -  avoid any strenuous activity. CALL M.D. ANY SIGN OF:  Increasing pain, nausea, vomiting  Abdominal distension (swelling)  New increased bleeding (oral or rectal)  Fever (chills)  Pain in chest area  Bloody discharge from nose or mouth  Shortness of breath  Tylenol as needed for pain.       Follow-up Instructions:   Call Dr. Edwina Peralta for questions about procedure at telephone #  [phone]              Repeat Colonoscopy in 10 years    Patient Education on Sedation / Analgesia Administered for Procedure      For 24 hours after general anesthesia or intravenous analgesia / sedation:  Have someone responsible help you with your care  Limit your activities  Do not drive and operate hazardous machinery  Do not make important personal, legal or business decisions  Do not drink alcoholic beverages  If you have not urinated within 8 hours after discharge, please contact your physician  Resume your medications unless otherwise instructed    For 24 hours after general anesthesia or intravenous analgesia / sedation  you may experience:  Drowsiness, dizziness, sleepiness, or confusion  Difficulty remembering or delayed reaction times  Difficulty with your balance, especially while walking, move slowly and carefully, do not make sudden position changes  Difficulty focusing or blurred vision    You may not be aware of slight changes in your behavior and/or your reaction time because of the medication used during and after your procedure.     Report the following to your physician:  Excessive pain, swelling, redness or odor of or around the surgical area  Temperature over 100.5  Nausea and vomiting lasting longer than 4 hours or if unable to take medications  Any signs of decreased circulation or nerve impairment to extremity: change in color, persistent numbness, tingling, coldness or increase pain  Any questions or concerns    IF YOU REPORT TO AN EMERGENCY ROOM, DOCTOR'S OFFICE OR HOSPITAL WITHIN 24 HOURS AFTER YOUR PROCEDURE, BRING THIS SHEET AND YOUR AFTER VISIT SUMMARY WITH YOU AND GIVE IT TO THE PHYSICIAN OR NURSE ATTENDING YOU.

## 2023-03-15 NOTE — PROCEDURES
Colonoscopy Procedure Note    Indications:   Screening colonoscopy    Referring Physician: [facility], Pcp (Inactive)  Anesthesia/Sedation: MAC anesthesia Propofol  Endoscopist:  Dr. Anna Brown    Procedure in Detail:  Informed consent was obtained for the procedure, including sedation. Risks of perforation, hemorrhage, adverse drug reaction, and aspiration were discussed. The patient was placed in the left lateral decubitus position. Based on the pre-procedure assessment, including review of the patient's medical history, medications, allergies, and review of systems, she had been deemed to be an appropriate candidate for moderate sedation; she was therefore sedated with the medications listed above. The patient was monitored continuously with ECG tracing, pulse oximetry, blood pressure monitoring, and direct observations. A rectal examination was performed. The [id number] was inserted into the rectum and advanced under direct vision to the terminal ileum. The quality of the colonic preparation was adequate. A careful inspection was made as the colonoscope was withdrawn, including a retroflexed view of the rectum; findings and interventions are described below. Appropriate photodocumentation was obtained. Findings:    Scope advanced to the cecum and terminal ileum. Preparation was adequate. No polyps seen. Mucosa was normal throughout the colon. Small internal hemorrhoids. Therapies:  none    Specimen:  none      Complications: None were encountered during the procedure. EBL: < 10 ml.     Recommendations:   -Repeat Colonoscopy in 10 years    Signed By: Ulysees Saint, MD                        March 15, 2023

## 2023-03-15 NOTE — H&P
Gastroenterology Outpatient History and Physical    Patient: [facility]    Physician: Kevin Dennison MD    Vital Signs: Blood pressure (!) 151/75, pulse 90, temperature 99 °F (37.2 °C), resp. rate 15, height 5' 1\" (1.549 m), weight 81.3 kg (179 lb 3.2 oz), last menstrual period 2014, SpO2 99 %, not currently breastfeeding. Allergies: Allergies   Allergen Reactions    Pravastatin Myalgia     Developed/worsened after ~6wks on medication. Resolved on stopping medication. Sulfa (Sulfonamide Antibiotics) Nausea and Vomiting       Chief Complaint: CRC screening    History of Present Illness: above    Justification for Procedure: above    History:  Past Medical History:   Diagnosis Date    Abnormal mammogram     calcifications in scar tissue     AR (allergic rhinitis)     Diabetes ([address]     TYPE II    Dyslipidemia 2012    Fibroid 2014    Gallbladder polyp     Hematuria     reports negative work-up by urology ?     Hypothyroidism     Menorrhagia 2014    Migraine headache     Obesity     Thyroid disease     Vesico-vaginal fistula 2015    Vitamin D deficiency 3/4/2014      Past Surgical History:   Procedure Laterality Date    HX ADENOIDECTOMY      AS A TEENAGER    HX BREAST REDUCTION Bilateral     HX  SECTION      x 2    HX HYSTERECTOMY      HX TONSIL AND ADENOIDECTOMY      HX TONSILLECTOMY      AS A TEENAGER    HX TOTAL LAPAROSCOPIC HYSTERECTOMY  2014    HX TUBAL LIGATION        Social History     Socioeconomic History    Marital status:    Tobacco Use    Smoking status: Never    Smokeless tobacco: Never   Vaping Use    Vaping Use: Never used   Substance and Sexual Activity    Alcohol use: No    Drug use: No    Sexual activity: Not Currently     Partners: Male     Birth control/protection: Surgical      Family History   Problem Relation Age of Onset    OSTEOARTHRITIS Mother     Diabetes Mother     Elevated Lipids Mother     Heart Disease Mother Hypertension Mother     Depression Mother     Heart Disease Father     Diabetes Father     Heart Disease Maternal Grandmother     Heart Attack Maternal Grandmother     Anesth Problems Neg Hx        Medications:   Prior to Admission medications    Medication Sig Start Date End Date Taking? Authorizing Provider   glipiZIDE SR (GLUCOTROL XL) 10 mg CR tablet Take 1 Tablet by mouth two (2) times a day. 12/14/22  Yes Johnson Arita MD   lisinopriL (PRINIVIL, ZESTRIL) 10 mg tablet TAKE 1 TABLET DAILY 11/10/22  Yes Johnson Arita MD   rosuvastatin (CRESTOR) 5 mg tablet Take 1 Tablet by mouth nightly. 8/11/22  Yes Johnson Arita MD   levothyroxine (SYNTHROID) 25 mcg tablet Take 1 Tablet by mouth Daily (before breakfast). 8/11/22  Yes Johnson Arita MD   Insulin Syringe-Needle U-100 0.5 mL 31 gauge x 5/16\" syrg USE ONE DAILY 7/6/22  Yes Johnson Arita MD   metFORMIN ER (GLUCOPHAGE XR) 500 mg tablet TAKE 2 TABLETS TWICE A DAY 6/6/22  Yes Johnson Arita MD   glipiZIDE SR (GLUCOTROL XL) 10 mg CR tablet Take 1 Tab by mouth two (2) times a day. 2/20/20  Yes Johnson Arita MD   glucose blood VI test strips (BLOOD GLUCOSE TEST) strip Pharmacist to choose preferred meter and strips. Dx: E11.65. Monitor BS once daily 4/5/18  Yes Johnson Arita MD   Blood-Glucose Meter monitoring kit Please provide preferred meter 3/30/18  Yes Johnson Arita MD   omeprazole (PRILOSEC) 20 mg capsule TAKE 1 CAPSULE DAILY 2/4/17  Yes Audrey Mary MD   magnesium 250 mg tab Take  by mouth daily. Yes Provider, Historical   multivitamin with iron tablet Take 1 Tab by mouth daily. Yes Provider, Historical   ibuprofen (MOTRIN) 800 mg tablet Take 1 Tab by mouth every eight (8) hours as needed for Pain. 6/19/14  Yes Joseluis Benitez MD   omega-3 fatty acids-vitamin e 1,000 mg cap Take 1 Cap by mouth daily. Yes Provider, Historical   ASPIRIN PO Take 81 mg by mouth daily.     Provider, Historical   insulin NPH/insulin regular (NovoLIN 70/30 U-100 Insulin) 100 unit/mL (70-30) injection INJECT 46 UNITS UNDER THE SKIN BEFORE DINNER ONLY 8/11/22   Audrey Kelly MD   PREVNAR 13, PF, 0.5 mL syrg injection INJECT ONCE  Patient not taking: Reported on 3/15/2023 10/3/16   Provider, Historical   FLUARIX QUAD 9994-1717, PF, syrg injection INJECT ONCE  Patient not taking: Reported on 3/15/2023 10/3/16   Provider, Historical       Physical Exam:   General: alert, no distress   HEENT: Head: Normocephalic, no lesions, without obvious abnormality.    Heart: regular rate and rhythm, S1, S2 normal, no murmur, click, rub or gallop   Lungs: chest clear, no wheezing, rales, normal symmetric air entry   Abdominal: soft, NT/ND+ BS   Neurological: Grossly normal   Extremities: extremities normal, atraumatic, no cyanosis or edema     Findings/Diagnosis: CRC screening     Plan of Care/Planned Procedure: Colonoscopy

## 2023-05-08 ENCOUNTER — OFFICE VISIT (OUTPATIENT)
Age: 53
End: 2023-05-08
Payer: OTHER GOVERNMENT

## 2023-05-08 VITALS
WEIGHT: 180.4 LBS | HEART RATE: 94 BPM | BODY MASS INDEX: 34.06 KG/M2 | DIASTOLIC BLOOD PRESSURE: 68 MMHG | HEIGHT: 61 IN | SYSTOLIC BLOOD PRESSURE: 127 MMHG

## 2023-05-08 DIAGNOSIS — Z79.4 TYPE 2 DIABETES MELLITUS WITH HYPERGLYCEMIA, WITH LONG-TERM CURRENT USE OF INSULIN (HCC): Primary | ICD-10-CM

## 2023-05-08 DIAGNOSIS — Z79.4 TYPE 2 DIABETES MELLITUS WITH HYPERGLYCEMIA, WITH LONG-TERM CURRENT USE OF INSULIN (HCC): ICD-10-CM

## 2023-05-08 DIAGNOSIS — E11.65 TYPE 2 DIABETES MELLITUS WITH HYPERGLYCEMIA, WITH LONG-TERM CURRENT USE OF INSULIN (HCC): Primary | ICD-10-CM

## 2023-05-08 DIAGNOSIS — E11.65 TYPE 2 DIABETES MELLITUS WITH HYPERGLYCEMIA, WITH LONG-TERM CURRENT USE OF INSULIN (HCC): ICD-10-CM

## 2023-05-08 PROCEDURE — 3078F DIAST BP <80 MM HG: CPT | Performed by: INTERNAL MEDICINE

## 2023-05-08 PROCEDURE — 99214 OFFICE O/P EST MOD 30 MIN: CPT | Performed by: INTERNAL MEDICINE

## 2023-05-08 PROCEDURE — 3074F SYST BP LT 130 MM HG: CPT | Performed by: INTERNAL MEDICINE

## 2023-05-08 NOTE — PROGRESS NOTES
Ms. Chidi Padron returns for follow-up of her type 2 diabetes mellitus with previously poor blood sugar control which is improved over the last several years. .       Current Diabetes Medications  Metformin 1000 mg twice daily,   Glipizide 10 mg am and pm  70/30  insulin 46 units at dinner. When I  saw her last her A1c was 8.0%. However, because her blood sugars in her meter were much better than that, I did a serum fructosamine which indicated a significant glycation gap. Fructosamine 318 c/w A1c of 7.0%. She presents today continuing to do very well. Blood sugars average 125. She continues to take the medications as noted above. In conversation today it became clear that she is not taking the insulin before dinner. Rather she is taking the insulin when she goes to bed at night. This is because she has long days and her  does not get off work and so they do not have dinner until late. So she forgets to take the insulin until 2 hours after dinner. She takes the insulin when she goes to bed. Blood sugars in the morning can be . She occasionally checks blood sugars before dinner and they can be in the 90-1 20 range. Few mornings at 3 AM when she had blood sugars in the 30s and 40s. She tells me that those occurred when she ate very early and then took her insulin at bedtime     Breakfast is oatmeal or a muffin or bagel. Lunch can be a soup or salad. Dinner can be soup or salad. Last night she had soup mashed potatoes and cheesecake. She occasionally snacks before dinner again because she is hungry. Examination  Blood pressure 127/68  Pulse 80  Weight 180  BMI 34.1    Impression  1. Type 2 diabetes mellitus with apparently excellent glucose control on combination therapy  2. Significant glycation gap requiring following the serum fructosamine is that the A1c  3.  Obesity    Plan:  1. There is a serum fructosamine pending at the time of this note. I will call her with results.

## 2023-05-31 RX ORDER — METFORMIN HYDROCHLORIDE 500 MG/1
TABLET, EXTENDED RELEASE ORAL
Qty: 360 TABLET | Refills: 3 | Status: SHIPPED | OUTPATIENT
Start: 2023-05-31

## 2023-06-15 LAB — FRUCTOSAMINE SERPL-SCNC: 280 UMOL/L (ref 0–285)

## 2023-08-14 RX ORDER — LEVOTHYROXINE SODIUM 0.03 MG/1
TABLET ORAL
Qty: 90 TABLET | Refills: 3 | Status: SHIPPED | OUTPATIENT
Start: 2023-08-14

## 2023-09-10 RX ORDER — SYRINGE AND NEEDLE,INSULIN,1ML 31 GX5/16"
SYRINGE, EMPTY DISPOSABLE MISCELLANEOUS
Qty: 100 EACH | Refills: 3 | Status: SHIPPED | OUTPATIENT
Start: 2023-09-10

## 2023-09-15 ENCOUNTER — TELEPHONE (OUTPATIENT)
Age: 53
End: 2023-09-15

## 2023-09-15 RX ORDER — CHLORAL HYDRATE 500 MG
1 CAPSULE ORAL NIGHTLY
COMMUNITY

## 2023-09-15 NOTE — TELEPHONE ENCOUNTER
Pt left a message stating she is having an endoscopy done on 9/18/2023 and would like to know how much insulin she should take the night before.

## 2023-09-18 ENCOUNTER — ANESTHESIA EVENT (OUTPATIENT)
Facility: HOSPITAL | Age: 53
End: 2023-09-18
Payer: OTHER GOVERNMENT

## 2023-09-18 ENCOUNTER — HOSPITAL ENCOUNTER (OUTPATIENT)
Facility: HOSPITAL | Age: 53
Setting detail: OUTPATIENT SURGERY
Discharge: HOME | End: 2023-09-18
Attending: INTERNAL MEDICINE | Admitting: INTERNAL MEDICINE
Payer: OTHER GOVERNMENT

## 2023-09-18 ENCOUNTER — ANESTHESIA (OUTPATIENT)
Facility: HOSPITAL | Age: 53
End: 2023-09-18
Payer: OTHER GOVERNMENT

## 2023-09-18 VITALS
DIASTOLIC BLOOD PRESSURE: 74 MMHG | RESPIRATION RATE: 23 BRPM | BODY MASS INDEX: 33.38 KG/M2 | OXYGEN SATURATION: 95 % | WEIGHT: 176.8 LBS | SYSTOLIC BLOOD PRESSURE: 133 MMHG | TEMPERATURE: 98.7 F | HEART RATE: 96 BPM | HEIGHT: 61 IN

## 2023-09-18 LAB
GLUCOSE BLD STRIP.AUTO-MCNC: 170 MG/DL (ref 65–117)
SERVICE CMNT-IMP: ABNORMAL

## 2023-09-18 PROCEDURE — 3600007512: Performed by: INTERNAL MEDICINE

## 2023-09-18 PROCEDURE — 6360000002 HC RX W HCPCS: Performed by: NURSE ANESTHETIST, CERTIFIED REGISTERED

## 2023-09-18 PROCEDURE — 88305 TISSUE EXAM BY PATHOLOGIST: CPT

## 2023-09-18 PROCEDURE — 3700000001 HC ADD 15 MINUTES (ANESTHESIA): Performed by: INTERNAL MEDICINE

## 2023-09-18 PROCEDURE — 2580000003 HC RX 258: Performed by: NURSE ANESTHETIST, CERTIFIED REGISTERED

## 2023-09-18 PROCEDURE — 2500000003 HC RX 250 WO HCPCS: Performed by: NURSE ANESTHETIST, CERTIFIED REGISTERED

## 2023-09-18 PROCEDURE — 3700000000 HC ANESTHESIA ATTENDED CARE: Performed by: INTERNAL MEDICINE

## 2023-09-18 PROCEDURE — 88342 IMHCHEM/IMCYTCHM 1ST ANTB: CPT

## 2023-09-18 PROCEDURE — 2709999900 HC NON-CHARGEABLE SUPPLY: Performed by: INTERNAL MEDICINE

## 2023-09-18 PROCEDURE — 3600007502: Performed by: INTERNAL MEDICINE

## 2023-09-18 PROCEDURE — 7100000010 HC PHASE II RECOVERY - FIRST 15 MIN: Performed by: INTERNAL MEDICINE

## 2023-09-18 PROCEDURE — 82962 GLUCOSE BLOOD TEST: CPT

## 2023-09-18 PROCEDURE — 7100000011 HC PHASE II RECOVERY - ADDTL 15 MIN: Performed by: INTERNAL MEDICINE

## 2023-09-18 RX ORDER — 0.9 % SODIUM CHLORIDE 0.9 %
INTRAVENOUS SOLUTION INTRAVENOUS PRN
Status: DISCONTINUED | OUTPATIENT
Start: 2023-09-18 | End: 2023-09-18 | Stop reason: SDUPTHER

## 2023-09-18 RX ORDER — GLYCOPYRROLATE 0.2 MG/ML
INJECTION INTRAMUSCULAR; INTRAVENOUS PRN
Status: DISCONTINUED | OUTPATIENT
Start: 2023-09-18 | End: 2023-09-18 | Stop reason: SDUPTHER

## 2023-09-18 RX ORDER — LIDOCAINE HYDROCHLORIDE 20 MG/ML
INJECTION, SOLUTION EPIDURAL; INFILTRATION; INTRACAUDAL; PERINEURAL PRN
Status: DISCONTINUED | OUTPATIENT
Start: 2023-09-18 | End: 2023-09-18 | Stop reason: SDUPTHER

## 2023-09-18 RX ORDER — SODIUM CHLORIDE 0.9 % (FLUSH) 0.9 %
5-40 SYRINGE (ML) INJECTION EVERY 12 HOURS SCHEDULED
Status: DISCONTINUED | OUTPATIENT
Start: 2023-09-18 | End: 2023-09-18 | Stop reason: HOSPADM

## 2023-09-18 RX ORDER — LISINOPRIL 10 MG/1
10 TABLET ORAL DAILY
Qty: 90 TABLET | Refills: 3 | Status: SHIPPED | OUTPATIENT
Start: 2023-09-18

## 2023-09-18 RX ORDER — SODIUM CHLORIDE 0.9 % (FLUSH) 0.9 %
5-40 SYRINGE (ML) INJECTION PRN
Status: DISCONTINUED | OUTPATIENT
Start: 2023-09-18 | End: 2023-09-18 | Stop reason: HOSPADM

## 2023-09-18 RX ORDER — GLIPIZIDE 10 MG/1
10 TABLET, FILM COATED, EXTENDED RELEASE ORAL 2 TIMES DAILY
Qty: 180 TABLET | Refills: 3 | Status: SHIPPED | OUTPATIENT
Start: 2023-09-18

## 2023-09-18 RX ORDER — ACETAMINOPHEN 500 MG
500 TABLET ORAL EVERY 6 HOURS PRN
COMMUNITY

## 2023-09-18 RX ORDER — SODIUM CHLORIDE 9 MG/ML
25 INJECTION, SOLUTION INTRAVENOUS PRN
Status: DISCONTINUED | OUTPATIENT
Start: 2023-09-18 | End: 2023-09-18 | Stop reason: HOSPADM

## 2023-09-18 RX ORDER — ROSUVASTATIN CALCIUM 5 MG/1
5 TABLET, COATED ORAL NIGHTLY
Qty: 90 TABLET | Refills: 3 | Status: SHIPPED | OUTPATIENT
Start: 2023-09-18

## 2023-09-18 RX ADMIN — LIDOCAINE HYDROCHLORIDE 100 MG: 20 INJECTION, SOLUTION EPIDURAL; INFILTRATION; INTRACAUDAL; PERINEURAL at 07:49

## 2023-09-18 RX ADMIN — SODIUM CHLORIDE 200 ML: 9 INJECTION, SOLUTION INTRAVENOUS at 07:54

## 2023-09-18 RX ADMIN — PROPOFOL 100 MG: 10 INJECTION, EMULSION INTRAVENOUS at 07:49

## 2023-09-18 RX ADMIN — GLYCOPYRROLATE 0.2 MG: 0.2 INJECTION, SOLUTION INTRAMUSCULAR; INTRAVENOUS at 07:45

## 2023-09-18 RX ADMIN — PROPOFOL 200 MG: 10 INJECTION, EMULSION INTRAVENOUS at 07:45

## 2023-09-18 ASSESSMENT — PAIN - FUNCTIONAL ASSESSMENT: PAIN_FUNCTIONAL_ASSESSMENT: 0-10

## 2023-09-18 NOTE — H&P
Pre-endoscopy H and P    The patient was seen and examined in the room/pre-op holding area. The airway was assessed and documented. The problem list, past medical history, and medications were reviewed. Patient Active Problem List   Diagnosis    Obesity    Vitamin D deficiency    Abnormal mammogram    Hematuria    Hepatic lesion    Migraine headache    Menorrhagia    Dyslipidemia    Diabetes mellitus (HCC)    Hypothyroidism    Hypertension    AR (allergic rhinitis)    Anemia    Fibroid    Gallbladder polyp    Vesico-vaginal fistula     Social History     Socioeconomic History    Marital status:      Spouse name: Not on file    Number of children: Not on file    Years of education: Not on file    Highest education level: Not on file   Occupational History    Not on file   Tobacco Use    Smoking status: Never    Smokeless tobacco: Never   Vaping Use    Vaping Use: Never used   Substance and Sexual Activity    Alcohol use: No    Drug use: No    Sexual activity: Not on file   Other Topics Concern    Not on file   Social History Narrative    Not on file     Social Determinants of Health     Financial Resource Strain: Not on file   Food Insecurity: Not on file   Transportation Needs: Not on file   Physical Activity: Not on file   Stress: Not on file   Social Connections: Not on file   Intimate Partner Violence: Not on file   Housing Stability: Not on file     Past Medical History:   Diagnosis Date    Abnormal mammogram     calcifications in scar tissue     AR (allergic rhinitis)     Diabetes ([address])     TYPE II    Dyslipidemia 4/11/2012    Fibroid 5/2/2014    Gallbladder polyp     Hematuria     reports negative work-up by urology 1997?     Hypothyroidism     Menorrhagia 5/2/2014    Migraine headache     Obesity     Sciatica of right side 09/2023    low back --recent tx with prednisone (last dose 09/17/23) PT scheduled for 09/26/23    Thyroid disease     Vesico-vaginal fistula 2/20/2015    Vitamin D deficiency

## 2023-09-18 NOTE — TELEPHONE ENCOUNTER
Requested Prescriptions     Pending Prescriptions Disp Refills    glipiZIDE (GLUCOTROL XL) 10 MG extended release tablet 180 tablet 3     Sig: Take 1 tablet by mouth 2 times daily    lisinopril (PRINIVIL;ZESTRIL) 10 MG tablet 90 tablet 3     Sig: Take 1 tablet by mouth daily    insulin 70-30 (HUMULIN;NOVOLIN) (70-30) 100 UNIT per ML injection vial       Sig: INJECT 46 UNITS UNDER THE SKIN BEFORE DINNER ONLY    rosuvastatin (CRESTOR) 5 MG tablet 90 tablet 3     Sig: Take 1 tablet by mouth nightly

## 2023-09-18 NOTE — ANESTHESIA POSTPROCEDURE EVALUATION
Department of Anesthesiology  Postprocedure Note    Patient: Shmuel Brenner  MRN: [id number]  YOB: 1970  Date of evaluation: 9/18/2023      Procedure Summary     Date: 09/18/23 Room / Location: [facility] [place] ENDOSCOPY    Anesthesia Start: 0730 Anesthesia Stop: 4823    Procedure: EGD DIAGNOSTIC ONLY Diagnosis:       Epigastric abdominal pain      (Epigastric abdominal pain [R10.13])    Surgeons: Sandra Buck MD Responsible Provider: Jani Reza MD    Anesthesia Type: MAC ASA Status: 3          Anesthesia Type: No value filed.     Sherif Phase I: Sherif Score: 10    Sherif Phase II:        Anesthesia Post Evaluation    Patient location during evaluation: PACU  Patient participation: complete - patient participated  Level of consciousness: sleepy but conscious and responsive to verbal stimuli  Airway patency: patent  Nausea & Vomiting: no vomiting and no nausea  Complications: no  Cardiovascular status: blood pressure returned to baseline and hemodynamically stable  Respiratory status: acceptable  Hydration status: stable

## 2023-09-18 NOTE — OP NOTE
[place] Office: [phone]      Esophagogastroduodenoscopy Procedure Note      Leia Richardson  1970  [id number]    Indication: Abdominal pain, epigastric     : Kimberley Albert MD    Referring Provider:  None None    Sedation:  MAC anesthesia Propofol    Procedure Details:  After detailed informed consent was obtained for the procedure, with all risks and benefits of procedure explained the patient was taken to the endoscopy suite and placed in the left lateral decubitus position. Following sequential administration of sedation as per above, the endoscope was inserted into the mouth and advanced under direct vision to second portion of the duodenum. A careful inspection was made as the gastroscope was withdrawn, including a retroflexed view of the proximal stomach; findings and interventions are described below. Findings:     Esophagus: The esophageal mucosa in the proximal, mid and distal esophagus is normal.   The squamo-columnar junction is at 35 cm where the Z-line was noted. Mild GE junction erythema noted. Biopsies taken    Stomach: The gastric mucosa has erythema in the  antrum in a radial spoke-like manner: biopsies were taken. Gastric body had mild diffuse erythema: biopsies taken. The visualized  fundus was found to be normal with no lesions noted on retroflexion. The angularis is normal    Duodenum:   The bulb and post bulbar mucosa is normal in appearance. The duodenal folds are normal.     Therapies:  biopsy of esophagus  biopsy of stomach body, antrum    Specimen: Specimens were collected as described and send to the laboratory. Complications:   None were encountered during the procedure. EBL:  None. Recommendations:     -Continue acid suppression. , -Acid suppression with a proton pump inhibitor. ,   -Await pathology. ,   -Follow symptoms. ,   -GERD diet: avoid fried and fatty foods.  peppermint, chocolate, alcohol, coffee, citrus fruits and

## 2023-09-18 NOTE — PROGRESS NOTES
ARRIVAL INFORMATION:  Verified patient name and date of birth, scheduled procedure, and informed consent. : Sylvia Manriquez () contact   Physician and staff can share information with the . Belongings with patient include:  Clothing,Jewelry        GI FOCUSED ASSESSMENT:  Neuro: Awake, alert, oriented x4  Respiratory: even and unlabored   GI: soft and non-distended  EKG Rhythm: normal sinus rhythm    Education:Reviewed general discharge instructions and  information. The risks and benefits of the bite block have been explained to patient. Patient verbalizes understanding.

## 2023-09-18 NOTE — PROGRESS NOTES
Endoscopy Case End Note:    Procedure scope was pre-cleaned, per protocol, at bedside by OSMEL Decker. Report received from anesthesia. See anesthesia flowsheet for intra-procedure vital signs and events. Belongings returned to patient.

## 2023-10-02 ENCOUNTER — OFFICE VISIT (OUTPATIENT)
Age: 53
End: 2023-10-02
Payer: OTHER GOVERNMENT

## 2023-10-02 VITALS
WEIGHT: 181 LBS | HEART RATE: 98 BPM | SYSTOLIC BLOOD PRESSURE: 117 MMHG | DIASTOLIC BLOOD PRESSURE: 79 MMHG | HEIGHT: 61 IN | BODY MASS INDEX: 34.17 KG/M2

## 2023-10-02 DIAGNOSIS — E11.65 TYPE 2 DIABETES MELLITUS WITH HYPERGLYCEMIA, WITH LONG-TERM CURRENT USE OF INSULIN (HCC): Primary | ICD-10-CM

## 2023-10-02 DIAGNOSIS — Z79.4 TYPE 2 DIABETES MELLITUS WITH HYPERGLYCEMIA, WITH LONG-TERM CURRENT USE OF INSULIN (HCC): ICD-10-CM

## 2023-10-02 DIAGNOSIS — E11.65 TYPE 2 DIABETES MELLITUS WITH HYPERGLYCEMIA, WITH LONG-TERM CURRENT USE OF INSULIN (HCC): ICD-10-CM

## 2023-10-02 DIAGNOSIS — Z79.4 TYPE 2 DIABETES MELLITUS WITH HYPERGLYCEMIA, WITH LONG-TERM CURRENT USE OF INSULIN (HCC): Primary | ICD-10-CM

## 2023-10-02 PROCEDURE — 3078F DIAST BP <80 MM HG: CPT | Performed by: INTERNAL MEDICINE

## 2023-10-02 PROCEDURE — 99214 OFFICE O/P EST MOD 30 MIN: CPT | Performed by: INTERNAL MEDICINE

## 2023-10-02 PROCEDURE — 3074F SYST BP LT 130 MM HG: CPT | Performed by: INTERNAL MEDICINE

## 2023-10-02 RX ORDER — OMEPRAZOLE 40 MG/1
1 CAPSULE, DELAYED RELEASE ORAL DAILY
COMMUNITY
Start: 2023-09-18

## 2023-10-02 NOTE — PROGRESS NOTES
with the results  3. I will see her back in 6 months      Please note that this dictation was completed with [employer], the computer voice recognition software. Quite often unanticipated grammatical, syntax, homophones, and other interpretive errors are inadvertently transcribed by the computer software. Please disregard these errors. Please excuse any errors that have escaped final proofreading.

## 2023-10-03 LAB
ALBUMIN SERPL-MCNC: 3.6 G/DL (ref 3.5–5)
ALBUMIN/GLOB SERPL: 1.1 (ref 1.1–2.2)
ALP SERPL-CCNC: 81 U/L (ref 45–117)
ALT SERPL-CCNC: 25 U/L (ref 12–78)
ANION GAP SERPL CALC-SCNC: 9 MMOL/L (ref 5–15)
AST SERPL-CCNC: 16 U/L (ref 15–37)
BILIRUB SERPL-MCNC: 0.2 MG/DL (ref 0.2–1)
BUN SERPL-MCNC: 11 MG/DL (ref 6–20)
BUN/CREAT SERPL: 16 (ref 12–20)
CALCIUM SERPL-MCNC: 8.9 MG/DL (ref 8.5–10.1)
CHLORIDE SERPL-SCNC: 107 MMOL/L (ref 97–108)
CHOLEST SERPL-MCNC: 141 MG/DL
CO2 SERPL-SCNC: 24 MMOL/L (ref 21–32)
CREAT SERPL-MCNC: 0.69 MG/DL (ref 0.55–1.02)
EST. AVERAGE GLUCOSE BLD GHB EST-MCNC: 166 MG/DL
GLOBULIN SER CALC-MCNC: 3.2 G/DL (ref 2–4)
GLUCOSE SERPL-MCNC: 178 MG/DL (ref 65–100)
HBA1C MFR BLD: 7.4 % (ref 4–5.6)
HDLC SERPL-MCNC: 54 MG/DL
HDLC SERPL: 2.6 (ref 0–5)
LDLC SERPL CALC-MCNC: 32.4 MG/DL (ref 0–100)
POTASSIUM SERPL-SCNC: 4.1 MMOL/L (ref 3.5–5.1)
PROT SERPL-MCNC: 6.8 G/DL (ref 6.4–8.2)
SODIUM SERPL-SCNC: 140 MMOL/L (ref 136–145)
TRIGL SERPL-MCNC: 273 MG/DL
VLDLC SERPL CALC-MCNC: 54.6 MG/DL

## 2023-10-05 LAB — FRUCTOSAMINE SERPL-SCNC: 269 UMOL/L (ref 0–285)

## 2023-10-10 ENCOUNTER — TRANSCRIBE ORDERS (OUTPATIENT)
Facility: HOSPITAL | Age: 53
End: 2023-10-10

## 2023-10-10 DIAGNOSIS — Z12.31 VISIT FOR SCREENING MAMMOGRAM: Primary | ICD-10-CM

## 2023-12-26 ENCOUNTER — HOSPITAL ENCOUNTER (OUTPATIENT)
Age: 53
Discharge: HOME | End: 2023-12-29
Payer: OTHER GOVERNMENT

## 2023-12-26 VITALS — BODY MASS INDEX: 33.79 KG/M2 | HEIGHT: 61 IN | WEIGHT: 179 LBS

## 2023-12-26 DIAGNOSIS — Z12.31 VISIT FOR SCREENING MAMMOGRAM: ICD-10-CM

## 2023-12-26 PROCEDURE — 77067 SCR MAMMO BI INCL CAD: CPT

## 2024-02-28 ENCOUNTER — TELEPHONE (OUTPATIENT)
Age: 54
End: 2024-02-28

## 2024-02-28 NOTE — TELEPHONE ENCOUNTER
Pt left a message stating that she is about to run out of 70/30 insulin and her next fill date is not until march 10th. Pt would like to know if it is okay for her to  the [facility] to last her until her shipment arrives.

## 2024-03-26 DIAGNOSIS — E11.65 TYPE 2 DIABETES MELLITUS WITH HYPERGLYCEMIA, WITH LONG-TERM CURRENT USE OF INSULIN (HCC): Primary | ICD-10-CM

## 2024-03-26 DIAGNOSIS — Z79.4 TYPE 2 DIABETES MELLITUS WITH HYPERGLYCEMIA, WITH LONG-TERM CURRENT USE OF INSULIN (HCC): Primary | ICD-10-CM

## 2024-04-02 LAB
ALBUMIN SERPL-MCNC: 4.5 G/DL (ref 3.8–4.9)
ALBUMIN/GLOB SERPL: 2 {RATIO} (ref 1.2–2.2)
ALP SERPL-CCNC: 72 IU/L (ref 44–121)
ALT SERPL-CCNC: 22 IU/L (ref 0–32)
AST SERPL-CCNC: 19 IU/L (ref 0–40)
BILIRUB SERPL-MCNC: 0.3 MG/DL (ref 0–1.2)
BUN SERPL-MCNC: 9 MG/DL (ref 6–24)
BUN/CREAT SERPL: 20 (ref 9–23)
CALCIUM SERPL-MCNC: 9.3 MG/DL (ref 8.7–10.2)
CHLORIDE SERPL-SCNC: 100 MMOL/L (ref 96–106)
CHOLEST SERPL-MCNC: 131 MG/DL (ref 100–199)
CO2 SERPL-SCNC: 23 MMOL/L (ref 20–29)
CREAT SERPL-MCNC: 0.44 MG/DL (ref 0.57–1)
EGFRCR SERPLBLD CKD-EPI 2021: 117 ML/MIN/1.73
FRUCTOSAMINE SERPL-SCNC: 278 UMOL/L (ref 0–285)
GLOBULIN SER CALC-MCNC: 2.3 G/DL (ref 1.5–4.5)
GLUCOSE SERPL-MCNC: 134 MG/DL (ref 70–99)
HDLC SERPL-MCNC: 60 MG/DL
IMP & REVIEW OF LAB RESULTS: NORMAL
LDLC SERPL CALC-MCNC: 46 MG/DL (ref 0–99)
Lab: NORMAL
POTASSIUM SERPL-SCNC: 4.2 MMOL/L (ref 3.5–5.2)
PROT SERPL-MCNC: 6.8 G/DL (ref 6–8.5)
SODIUM SERPL-SCNC: 138 MMOL/L (ref 134–144)
TRIGL SERPL-MCNC: 150 MG/DL (ref 0–149)
VLDLC SERPL CALC-MCNC: 25 MG/DL (ref 5–40)

## 2024-04-05 ENCOUNTER — OFFICE VISIT (OUTPATIENT)
Age: 54
End: 2024-04-05
Payer: OTHER GOVERNMENT

## 2024-04-05 VITALS
SYSTOLIC BLOOD PRESSURE: 148 MMHG | RESPIRATION RATE: 16 BRPM | DIASTOLIC BLOOD PRESSURE: 78 MMHG | WEIGHT: 178.6 LBS | BODY MASS INDEX: 33.72 KG/M2 | HEIGHT: 61 IN

## 2024-04-05 DIAGNOSIS — Z79.4 TYPE 2 DIABETES MELLITUS WITH HYPERGLYCEMIA, WITH LONG-TERM CURRENT USE OF INSULIN (HCC): Primary | ICD-10-CM

## 2024-04-05 DIAGNOSIS — E11.65 TYPE 2 DIABETES MELLITUS WITH HYPERGLYCEMIA, WITH LONG-TERM CURRENT USE OF INSULIN (HCC): Primary | ICD-10-CM

## 2024-04-05 PROCEDURE — 99214 OFFICE O/P EST MOD 30 MIN: CPT | Performed by: INTERNAL MEDICINE

## 2024-04-05 PROCEDURE — 3078F DIAST BP <80 MM HG: CPT | Performed by: INTERNAL MEDICINE

## 2024-04-05 PROCEDURE — 3077F SYST BP >= 140 MM HG: CPT | Performed by: INTERNAL MEDICINE

## 2024-04-05 RX ORDER — ESTRADIOL 0.1 MG/G
CREAM VAGINAL
COMMUNITY
Start: 2023-11-01

## 2024-04-05 NOTE — PROGRESS NOTES
Ella Nava is a 53 y.o. female    Chief Complaint   Patient presents with    Follow-up       BP (!) 148/78   Resp 16   Ht 1.549 m (5' 1\")   Wt 81 kg (178 lb 9.6 oz)   BMI 33.75 kg/m²         1. Have you been to the ER, urgent care clinic since your last visit?  Hospitalized since your last visit? No    2. Have you seen or consulted any other health care providers outside of the [facility] since your last visit?  Include any pap smears or colon screening. No    Learning Assessment:       No data to display                Fall Risk Assessment:       No data to display                Abuse Screening:       No data to display                ADL Screening:       No data to display

## 2024-04-05 NOTE — PROGRESS NOTES
Ms. Enriquez returns for follow-up of her type 2 diabetes mellitus with previously poor blood sugar control which is improved over the last several years..       Current Diabetes Medications  Metformin 1000 mg twice daily,   Glipizide 10 mg am and pm  70/30  insulin 46 units at dinner.      Her A1c today is  6.3 % based on a fructosamine of 278..She presents today continuing to do very well.  Blood sugars average 125.  She continues to take the medications as noted above. Blood sugars in the morning can be .  Tell me she does tell me today that she has developed some back pain and was given a course of glucocorticoids for a week.  She is also on PT which is helping somewhat.  The steroids apparently did not not improve her back pain but her blood sugars went up and she took extra insulin. Blood sugars on her meter indicate that she did a very good job of this.  She occasionally checks blood sugars before dinner and they can be in the 90-1 20 range.       Breakfast is oatmeal or a muffin or bagel.  Lunch can be a soup or salad.  Dinner can be soup or salad.  Last night she had soup mashed potatoes and cheesecake.  She occasionally snacks before dinner again because she is hungry.    Examination  Blood pressure 148/78  Pulse 64  Weight 81 kg  BMI 33.8  HEENT unremarkable  Lungs clear  Heart reveals a regular rate and rhythm  Abdomen benign  Extremities unremarkable  Diabetic foot exam:   Left Foot:   Visual Exam: normal   Pulse DP: 2+ (normal)   Filament test: normal sensation   Vibratory Sensation: normal  Right Foot:   Visual Exam: normal   Pulse DP: 2+ (normal)   Filament test: normal sensation   Vibratory Sensation: normal     Impression  1.  Type 2 diabetes mellitus with excellent glucose control on combination therapy  2.  Glycation gap of about 1% requiring fructosamine measurements  3.  Obesity    Plan:  1.  I did not change the regimen  2.  I did review all of her labs with her  3.  I will see her back in

## 2024-05-27 RX ORDER — METFORMIN HYDROCHLORIDE 500 MG/1
TABLET, EXTENDED RELEASE ORAL
Qty: 360 TABLET | Refills: 3 | Status: SHIPPED | OUTPATIENT
Start: 2024-05-27

## 2024-06-18 RX ORDER — GLIPIZIDE 10 MG/1
10 TABLET, FILM COATED, EXTENDED RELEASE ORAL 2 TIMES DAILY
Qty: 180 TABLET | Refills: 3 | Status: SHIPPED | OUTPATIENT
Start: 2024-06-18

## 2024-06-18 RX ORDER — LISINOPRIL 10 MG/1
10 TABLET ORAL DAILY
Qty: 90 TABLET | Refills: 3 | Status: SHIPPED | OUTPATIENT
Start: 2024-06-18

## 2024-06-18 RX ORDER — METFORMIN HYDROCHLORIDE 500 MG/1
1000 TABLET, EXTENDED RELEASE ORAL 2 TIMES DAILY
Qty: 360 TABLET | Refills: 3 | Status: SHIPPED | OUTPATIENT
Start: 2024-06-18

## 2024-06-18 RX ORDER — ROSUVASTATIN CALCIUM 5 MG/1
5 TABLET, COATED ORAL NIGHTLY
Qty: 90 TABLET | Refills: 3 | Status: SHIPPED | OUTPATIENT
Start: 2024-06-18

## 2024-06-18 RX ORDER — LEVOTHYROXINE SODIUM 0.03 MG/1
TABLET ORAL
Qty: 90 TABLET | Refills: 3 | Status: SHIPPED | OUTPATIENT
Start: 2024-06-18

## 2024-09-25 ENCOUNTER — TELEPHONE (OUTPATIENT)
Age: 54
End: 2024-09-25

## 2024-09-25 DIAGNOSIS — Z79.4 TYPE 2 DIABETES MELLITUS WITH HYPERGLYCEMIA, WITH LONG-TERM CURRENT USE OF INSULIN (HCC): Primary | ICD-10-CM

## 2024-09-25 DIAGNOSIS — E11.65 TYPE 2 DIABETES MELLITUS WITH HYPERGLYCEMIA, WITH LONG-TERM CURRENT USE OF INSULIN (HCC): Primary | ICD-10-CM

## 2024-09-25 NOTE — TELEPHONE ENCOUNTER
I received a phone message from Ms. Nava asking for a lab order to pe put in the system so she can get her labs done prior to her 10/6/2024 appointment. She would like a call back when it is done.  Meron

## 2024-10-03 LAB
ERYTHROCYTE [DISTWIDTH] IN BLOOD BY AUTOMATED COUNT: 12.3 % (ref 11.7–15.4)
HCT VFR BLD AUTO: 40.6 % (ref 34–46.6)
HGB BLD-MCNC: 12.9 G/DL (ref 11.1–15.9)
MCH RBC QN AUTO: 30.3 PG (ref 26.6–33)
MCHC RBC AUTO-ENTMCNC: 31.8 G/DL (ref 31.5–35.7)
MCV RBC AUTO: 95 FL (ref 79–97)
PLATELET # BLD AUTO: 300 X10E3/UL (ref 150–450)
RBC # BLD AUTO: 4.26 X10E6/UL (ref 3.77–5.28)
WBC # BLD AUTO: 9.7 X10E3/UL (ref 3.4–10.8)

## 2024-10-04 LAB
ALBUMIN SERPL-MCNC: 4.5 G/DL (ref 3.8–4.9)
ALBUMIN/CREAT UR: 36 MG/G CREAT (ref 0–29)
ALP SERPL-CCNC: 80 IU/L (ref 44–121)
ALT SERPL-CCNC: 19 IU/L (ref 0–32)
AST SERPL-CCNC: 18 IU/L (ref 0–40)
BILIRUB SERPL-MCNC: 0.3 MG/DL (ref 0–1.2)
BUN SERPL-MCNC: 13 MG/DL (ref 6–24)
BUN/CREAT SERPL: 21 (ref 9–23)
CALCIUM SERPL-MCNC: 9.8 MG/DL (ref 8.7–10.2)
CHLORIDE SERPL-SCNC: 96 MMOL/L (ref 96–106)
CO2 SERPL-SCNC: 24 MMOL/L (ref 20–29)
CREAT SERPL-MCNC: 0.61 MG/DL (ref 0.57–1)
CREAT UR-MCNC: 38.3 MG/DL
EGFRCR SERPLBLD CKD-EPI 2021: 108 ML/MIN/1.73
FRUCTOSAMINE SERPL-SCNC: 298 UMOL/L (ref 0–285)
GLOBULIN SER CALC-MCNC: 2.3 G/DL (ref 1.5–4.5)
GLUCOSE SERPL-MCNC: 180 MG/DL (ref 70–99)
MICROALBUMIN UR-MCNC: 13.7 UG/ML
POTASSIUM SERPL-SCNC: 4.4 MMOL/L (ref 3.5–5.2)
PROT SERPL-MCNC: 6.8 G/DL (ref 6–8.5)
SODIUM SERPL-SCNC: 136 MMOL/L (ref 134–144)

## 2024-10-07 ENCOUNTER — OFFICE VISIT (OUTPATIENT)
Age: 54
End: 2024-10-07
Payer: OTHER GOVERNMENT

## 2024-10-07 VITALS
SYSTOLIC BLOOD PRESSURE: 138 MMHG | HEIGHT: 61 IN | OXYGEN SATURATION: 99 % | DIASTOLIC BLOOD PRESSURE: 66 MMHG | BODY MASS INDEX: 33.95 KG/M2 | WEIGHT: 179.8 LBS | HEART RATE: 73 BPM

## 2024-10-07 DIAGNOSIS — E11.65 TYPE 2 DIABETES MELLITUS WITH HYPERGLYCEMIA, WITH LONG-TERM CURRENT USE OF INSULIN (HCC): Primary | ICD-10-CM

## 2024-10-07 DIAGNOSIS — Z79.4 TYPE 2 DIABETES MELLITUS WITH HYPERGLYCEMIA, WITH LONG-TERM CURRENT USE OF INSULIN (HCC): Primary | ICD-10-CM

## 2024-10-07 PROCEDURE — 99214 OFFICE O/P EST MOD 30 MIN: CPT | Performed by: INTERNAL MEDICINE

## 2024-10-07 PROCEDURE — 3078F DIAST BP <80 MM HG: CPT | Performed by: INTERNAL MEDICINE

## 2024-10-07 PROCEDURE — 3075F SYST BP GE 130 - 139MM HG: CPT | Performed by: INTERNAL MEDICINE

## 2024-10-07 NOTE — PROGRESS NOTES
Ms. Enriquez returns for follow-up of her type 2 diabetes mellitus with previously poor blood sugar control which is improved over the last several years..       Current Diabetes Medications  Metformin 1000 mg twice daily,   Glipizide 10 mg am and pm  70/30  insulin 46 units at dinner.      Her A1c today is  6.7 % based on a fructosamine..  She tells me today that the summer has been difficult for her.  She apparently developed chronic ear infections requiring multiple courses of antibiotics and in fact she required glucocorticoids which raised her blood sugar.  She is actually surprised her A1c is as good as it is.  But more recently, things are returning to normal.  Blood sugars average 125.  She continues to take the medications as noted above. Blood sugars in the morning can be .  Tell me she does tell me today that she has developed some back pain and was given a course of glucocorticoids for a week.  She is also on PT which is helping somewhat.  The steroids apparently did not not improve her back pain but her blood sugars went up and she took extra insulin. Blood sugars on her meter indicate that she did a very good job of this.  She occasionally checks blood sugars before dinner and they can be in the 90-1 20 range.       Breakfast is oatmeal or a muffin or bagel.  Lunch can be a soup or salad.  Dinner can be soup or salad.  Last night she had soup mashed potatoes and cheesecake.  She occasionally snacks before dinner again because she is hungry.    Examination  Blood pressure 138/66  Pulse 73  Weight 81.6 kg  BMI 33.9  HEENT unremarkable  Lungs clear  Heart reveals a regular rate and rhythm  Abdomen benign  Extremities unremarkable  Diabetic foot exam:   Left Foot:   Visual Exam: normal   Pulse DP: 2+ (normal)   Filament test: normal sensation   Vibratory Sensation: normal  Right Foot:   Visual Exam: normal   Pulse DP: 2+ (normal)   Filament test: normal sensation   Vibratory Sensation: normal

## 2024-10-07 NOTE — PROGRESS NOTES
Patient identified by name and date of birth  Ella Nava is a 53 y.o. female   Chief Complaint   Patient presents with    Diabetes     Type 2 diabetes mellitus with hyperglycemia, with long-term current use of insulin ([facility])      Vitals:    10/07/24 1520   BP: 138/66   Site: Left Upper Arm   Pulse: 73   SpO2: 99%   Weight: 81.6 kg (179 lb 12.8 oz)   Height: 1.549 m (5' 1\")       No LMP recorded. (Menstrual status: Menopause).       \"Have you been to the ER, urgent care clinic since your last visit?  Hospitalized since your last visit?\"    NO    “Have you seen or consulted any other health care providers outside of [facility] since your last visit?”    NO

## 2024-10-28 RX ORDER — AMOXICILLIN 875 MG/1
TABLET, FILM COATED ORAL
Qty: 100 EACH | Refills: 5 | Status: SHIPPED | OUTPATIENT
Start: 2024-10-28

## 2024-11-20 ENCOUNTER — TRANSCRIBE ORDERS (OUTPATIENT)
Facility: HOSPITAL | Age: 54
End: 2024-11-20

## 2024-11-20 DIAGNOSIS — G57.61 LESION OF RIGHT PLANTAR NERVE: Primary | ICD-10-CM

## 2024-12-02 ENCOUNTER — HOSPITAL ENCOUNTER (OUTPATIENT)
Age: 54
Discharge: HOME | End: 2024-12-05
Payer: OTHER GOVERNMENT

## 2024-12-02 DIAGNOSIS — G57.61 LESION OF RIGHT PLANTAR NERVE: ICD-10-CM

## 2024-12-02 PROCEDURE — 76882 US LMTD JT/FCL EVL NVASC XTR: CPT

## 2024-12-24 RX ORDER — BLOOD-GLUCOSE METER
KIT MISCELLANEOUS
Qty: 200 EACH | Refills: 3 | Status: SHIPPED | OUTPATIENT
Start: 2024-12-24

## 2024-12-24 NOTE — TELEPHONE ENCOUNTER
Requested Prescriptions     Pending Prescriptions Disp Refills    blood glucose test strips (FREESTYLE LITE) strip 200 each 3     Sig: Test blood sugar 2 times daily.

## 2024-12-27 ENCOUNTER — HOSPITAL ENCOUNTER (OUTPATIENT)
Age: 54
Discharge: HOME | End: 2024-12-30
Payer: OTHER GOVERNMENT

## 2024-12-27 VITALS — HEIGHT: 61 IN | BODY MASS INDEX: 33.99 KG/M2 | WEIGHT: 180 LBS

## 2024-12-27 DIAGNOSIS — Z12.31 VISIT FOR SCREENING MAMMOGRAM: ICD-10-CM

## 2024-12-27 PROCEDURE — 77067 SCR MAMMO BI INCL CAD: CPT

## 2025-01-06 ENCOUNTER — HOSPITAL ENCOUNTER (OUTPATIENT)
Age: 55
Discharge: HOME | End: 2025-01-09
Payer: OTHER GOVERNMENT

## 2025-01-06 DIAGNOSIS — G57.61 LESION OF PLANTAR NERVE, RIGHT: ICD-10-CM

## 2025-01-06 PROCEDURE — 73718 MRI LOWER EXTREMITY W/O DYE: CPT

## 2025-01-31 DIAGNOSIS — E11.65 TYPE 2 DIABETES MELLITUS WITH HYPERGLYCEMIA, WITH LONG-TERM CURRENT USE OF INSULIN (HCC): ICD-10-CM

## 2025-01-31 DIAGNOSIS — Z79.4 TYPE 2 DIABETES MELLITUS WITH HYPERGLYCEMIA, WITH LONG-TERM CURRENT USE OF INSULIN (HCC): ICD-10-CM

## 2025-01-31 RX ORDER — LEVOTHYROXINE SODIUM 25 UG/1
TABLET ORAL
Qty: 90 TABLET | Refills: 3 | Status: SHIPPED | OUTPATIENT
Start: 2025-01-31

## 2025-01-31 RX ORDER — LISINOPRIL 10 MG/1
10 TABLET ORAL DAILY
Qty: 90 TABLET | Refills: 3 | Status: SHIPPED | OUTPATIENT
Start: 2025-01-31

## 2025-03-31 ENCOUNTER — TELEPHONE (OUTPATIENT)
Age: 55
End: 2025-03-31

## 2025-03-31 DIAGNOSIS — Z79.4 TYPE 2 DIABETES MELLITUS WITH HYPERGLYCEMIA, WITH LONG-TERM CURRENT USE OF INSULIN (HCC): Primary | ICD-10-CM

## 2025-03-31 DIAGNOSIS — E11.65 TYPE 2 DIABETES MELLITUS WITH HYPERGLYCEMIA, WITH LONG-TERM CURRENT USE OF INSULIN (HCC): Primary | ICD-10-CM

## 2025-04-01 ENCOUNTER — TELEPHONE (OUTPATIENT)
Age: 55
End: 2025-04-01

## 2025-04-01 DIAGNOSIS — Z79.4 TYPE 2 DIABETES MELLITUS WITH HYPERGLYCEMIA, WITH LONG-TERM CURRENT USE OF INSULIN (HCC): Primary | ICD-10-CM

## 2025-04-01 DIAGNOSIS — E11.65 TYPE 2 DIABETES MELLITUS WITH HYPERGLYCEMIA, WITH LONG-TERM CURRENT USE OF INSULIN (HCC): Primary | ICD-10-CM

## 2025-04-01 NOTE — TELEPHONE ENCOUNTER
Patient left  stating that her foot surgery has been postponed until 8/1/25. She would like a CBC and Chemistry panel added to her lab order. Patient can be reached at [phone].

## 2025-04-09 LAB
ERYTHROCYTE [DISTWIDTH] IN BLOOD BY AUTOMATED COUNT: 12.7 % (ref 11.7–15.4)
HCT VFR BLD AUTO: 38.3 % (ref 34–46.6)
HGB BLD-MCNC: 12.6 G/DL (ref 11.1–15.9)
MCH RBC QN AUTO: 30.2 PG (ref 26.6–33)
MCHC RBC AUTO-ENTMCNC: 32.9 G/DL (ref 31.5–35.7)
MCV RBC AUTO: 92 FL (ref 79–97)
PLATELET # BLD AUTO: 286 X10E3/UL (ref 150–450)
RBC # BLD AUTO: 4.17 X10E6/UL (ref 3.77–5.28)
WBC # BLD AUTO: 8.9 X10E3/UL (ref 3.4–10.8)

## 2025-04-10 LAB
ALBUMIN SERPL-MCNC: 4.6 G/DL (ref 3.8–4.9)
ALBUMIN/CREAT UR: 54 MG/G CREAT (ref 0–29)
ALP SERPL-CCNC: 78 IU/L (ref 44–121)
ALT SERPL-CCNC: 16 IU/L (ref 0–32)
AST SERPL-CCNC: 17 IU/L (ref 0–40)
BILIRUB SERPL-MCNC: 0.2 MG/DL (ref 0–1.2)
BUN SERPL-MCNC: 10 MG/DL (ref 6–24)
BUN/CREAT SERPL: 19 (ref 9–23)
CALCIUM SERPL-MCNC: 9.6 MG/DL (ref 8.7–10.2)
CHLORIDE SERPL-SCNC: 98 MMOL/L (ref 96–106)
CO2 SERPL-SCNC: 25 MMOL/L (ref 20–29)
CREAT SERPL-MCNC: 0.54 MG/DL (ref 0.57–1)
CREAT UR-MCNC: 54.3 MG/DL
EGFRCR SERPLBLD CKD-EPI 2021: 111 ML/MIN/1.73
FRUCTOSAMINE SERPL-SCNC: 288 UMOL/L (ref 0–285)
GLOBULIN SER CALC-MCNC: 2.5 G/DL (ref 1.5–4.5)
GLUCOSE SERPL-MCNC: 152 MG/DL (ref 70–99)
MICROALBUMIN UR-MCNC: 29.4 UG/ML
POTASSIUM SERPL-SCNC: 4.8 MMOL/L (ref 3.5–5.2)
PROT SERPL-MCNC: 7.1 G/DL (ref 6–8.5)
SODIUM SERPL-SCNC: 139 MMOL/L (ref 134–144)

## 2025-04-18 ENCOUNTER — OFFICE VISIT (OUTPATIENT)
Age: 55
End: 2025-04-18
Payer: OTHER GOVERNMENT

## 2025-04-18 VITALS
HEART RATE: 95 BPM | DIASTOLIC BLOOD PRESSURE: 65 MMHG | HEIGHT: 61 IN | SYSTOLIC BLOOD PRESSURE: 128 MMHG | WEIGHT: 182.4 LBS | BODY MASS INDEX: 34.44 KG/M2

## 2025-04-18 DIAGNOSIS — Z79.4 TYPE 2 DIABETES MELLITUS WITH HYPERGLYCEMIA, WITH LONG-TERM CURRENT USE OF INSULIN (HCC): Primary | ICD-10-CM

## 2025-04-18 DIAGNOSIS — E11.65 TYPE 2 DIABETES MELLITUS WITH HYPERGLYCEMIA, WITH LONG-TERM CURRENT USE OF INSULIN (HCC): Primary | ICD-10-CM

## 2025-04-18 PROCEDURE — 3078F DIAST BP <80 MM HG: CPT | Performed by: INTERNAL MEDICINE

## 2025-04-18 PROCEDURE — 3074F SYST BP LT 130 MM HG: CPT | Performed by: INTERNAL MEDICINE

## 2025-04-18 PROCEDURE — 99214 OFFICE O/P EST MOD 30 MIN: CPT | Performed by: INTERNAL MEDICINE

## 2025-04-18 RX ORDER — OMEPRAZOLE 20 MG/1
20 CAPSULE, DELAYED RELEASE ORAL DAILY
Qty: 90 CAPSULE | Refills: 0 | Status: SHIPPED | OUTPATIENT
Start: 2025-04-18

## 2025-04-18 NOTE — PROGRESS NOTES
Ms. Enriquez returns for follow-up of her type 2 diabetes mellitus with previously poor blood sugar control which is improved over the last several years..  She has a significant glycation  gap (A1c 7.4% versus a calculated fructosamine of 6.2%) so we use her fructosamine to follow her glucose control.      Current Diabetes Medications  Metformin 1000 mg twice daily,   Glipizide 10 mg am and pm  70/30  insulin 46 units at dinner.      Her A1c today is  6.5 % based on a fructosamine..      But more recently, things are returning to normal.  Blood sugars average 125.  She continues to take the medications as noted above. Blood sugars in the morning can be .  Tell me she does tell me today that she has developed some back pain and was given a course of glucocorticoids for a week.  She is also on PT which is helping somewhat.  The steroids apparently did not not improve her back pain but her blood sugars went up and she took extra insulin. Blood sugars on her meter indicate that she did a very good job of this.  She occasionally checks blood sugars before dinner and they can be in the 90-1 20 range.       Breakfast is oatmeal or a muffin or bagel.  Lunch can be a soup or salad.  Dinner can be soup or salad.  Last night she had soup mashed potatoes and cheesecake.  She occasionally snacks before dinner again because she is hungry.    Examination  Blood pressure 128/65  Pulse 95  Weight 83 kg  BMI 34.5  HEENT unremarkable  Lungs clear  Heart reveals a regular rate and rhythm  Abdomen benign  Extremities unremarkable  Diabetic foot exam:   Left Foot:   Visual Exam: normal   Pulse DP: 2+ (normal)   Filament test: normal sensation   Vibratory Sensation: normal  Right Foot:   Visual Exam: normal   Pulse DP: 2+ (normal)   Filament test: normal sensation   Vibratory Sensation: normal     Impression  1.  Type 2 diabetes mellitus with excellent glucose control on combination therapy  2.  Elevated urine albumin creatinine ratio  What Type Of Note Output Would You Prefer (Optional)?: Standard Output How Severe Is Your Acne?: moderate Is This A New Presentation, Or A Follow-Up?: Acne Additional Comments (Use Complete Sentences): Pt states he has cysts that do not go away and keep spreading from face to neck and back of the head

## 2025-04-26 LAB
CHOLEST SERPL-MCNC: 134 MG/DL (ref 100–199)
HDLC SERPL-MCNC: 53 MG/DL
IMP & REVIEW OF LAB RESULTS: NORMAL
LDLC SERPL CALC-MCNC: 56 MG/DL (ref 0–99)
TRIGL SERPL-MCNC: 147 MG/DL (ref 0–149)
VLDLC SERPL CALC-MCNC: 25 MG/DL (ref 5–40)

## 2025-06-23 RX ORDER — ROSUVASTATIN CALCIUM 5 MG/1
5 TABLET, COATED ORAL NIGHTLY
Qty: 90 TABLET | Refills: 3 | Status: SHIPPED | OUTPATIENT
Start: 2025-06-23

## 2025-07-21 RX ORDER — METFORMIN HYDROCHLORIDE 500 MG/1
1000 TABLET, EXTENDED RELEASE ORAL 2 TIMES DAILY
Qty: 360 TABLET | Refills: 3 | Status: SHIPPED | OUTPATIENT
Start: 2025-07-21

## 2025-08-07 RX ORDER — GLIPIZIDE 10 MG/1
10 TABLET, FILM COATED, EXTENDED RELEASE ORAL 2 TIMES DAILY
Qty: 180 TABLET | Refills: 3 | Status: SHIPPED | OUTPATIENT
Start: 2025-08-07

## 2025-08-13 DIAGNOSIS — Z79.4 TYPE 2 DIABETES MELLITUS WITH HYPERGLYCEMIA, WITH LONG-TERM CURRENT USE OF INSULIN (HCC): ICD-10-CM

## 2025-08-13 DIAGNOSIS — E11.65 TYPE 2 DIABETES MELLITUS WITH HYPERGLYCEMIA, WITH LONG-TERM CURRENT USE OF INSULIN (HCC): ICD-10-CM

## 2025-08-14 RX ORDER — BLOOD-GLUCOSE METER
KIT MISCELLANEOUS
Qty: 200 EACH | Refills: 3 | Status: SHIPPED | OUTPATIENT
Start: 2025-08-14

## 2025-08-14 RX ORDER — LEVOTHYROXINE SODIUM 25 UG/1
TABLET ORAL
Qty: 90 TABLET | Refills: 3 | Status: SHIPPED | OUTPATIENT
Start: 2025-08-14

## (undated) DEVICE — NEONATAL-ADULT SPO2 SENSOR: Brand: NELLCOR

## (undated) DEVICE — TOWEL 4 PLY TISS 19X30 SUE WHT

## (undated) DEVICE — BIOPSY FORCEPS STANDARD CAPACITY RADIAL JAW 4 YELLOW

## (undated) DEVICE — CATH IV AUTOGRD BC PNK 20GA 25 -- INSYTE

## (undated) DEVICE — Z DISCONTINUED PER MEDLINE LINE GAS SAMPLING O2/CO2 LNG AD 13 FT NSL W/ TBNG FILTERLINE

## (undated) DEVICE — HYPODERMIC SAFETY NEEDLE: Brand: MAGELLAN

## (undated) DEVICE — Device

## (undated) DEVICE — SET ADMIN 16ML TBNG L100IN 2 Y INJ SITE IV PIGGY BK DISP (ORDER IN MULIPLES OF 48)

## (undated) DEVICE — 1200 GUARD II KIT W/5MM TUBE W/O VAC TUBE: Brand: GUARDIAN

## (undated) DEVICE — CONTAINER SPEC 20 ML LID NEUT BUFF FORMALIN 10 % POLYPR STS

## (undated) DEVICE — ELECTRODE,RADIOTRANSLUCENT,FOAM,5PK: Brand: MEDLINE

## (undated) DEVICE — BASIN EMSIS 16OZ GRAPHITE PLAS KID SHP MOLD GRAD FOR ORAL

## (undated) DEVICE — SYRINGE MED 10ML LUERLOCK TIP W/O SFTY DISP

## (undated) DEVICE — SYRINGE MED 3ML CLR PLAS STD N CTRL LUERLOCK TIP DISP